# Patient Record
Sex: FEMALE | Race: WHITE
[De-identification: names, ages, dates, MRNs, and addresses within clinical notes are randomized per-mention and may not be internally consistent; named-entity substitution may affect disease eponyms.]

---

## 2017-02-06 ENCOUNTER — HOSPITAL ENCOUNTER (EMERGENCY)
Dept: HOSPITAL 62 - ER | Age: 52
Discharge: HOME | End: 2017-02-06
Payer: MEDICARE

## 2017-02-06 VITALS — SYSTOLIC BLOOD PRESSURE: 108 MMHG | DIASTOLIC BLOOD PRESSURE: 51 MMHG

## 2017-02-06 DIAGNOSIS — M32.9: ICD-10-CM

## 2017-02-06 DIAGNOSIS — Z88.8: ICD-10-CM

## 2017-02-06 DIAGNOSIS — Z85.828: ICD-10-CM

## 2017-02-06 DIAGNOSIS — Z86.14: ICD-10-CM

## 2017-02-06 DIAGNOSIS — Z88.1: ICD-10-CM

## 2017-02-06 DIAGNOSIS — G43.909: Primary | ICD-10-CM

## 2017-02-06 DIAGNOSIS — Z88.5: ICD-10-CM

## 2017-02-06 DIAGNOSIS — Z88.2: ICD-10-CM

## 2017-02-06 DIAGNOSIS — Z87.892: ICD-10-CM

## 2017-02-06 DIAGNOSIS — Z91.040: ICD-10-CM

## 2017-02-06 LAB — PROTHROMBIN TIME: 21.7 SEC (ref 11.4–15.4)

## 2017-02-06 PROCEDURE — 36415 COLL VENOUS BLD VENIPUNCTURE: CPT

## 2017-02-06 PROCEDURE — 99284 EMERGENCY DEPT VISIT MOD MDM: CPT

## 2017-02-06 PROCEDURE — 96372 THER/PROPH/DIAG INJ SC/IM: CPT

## 2017-02-06 PROCEDURE — S0119 ONDANSETRON 4 MG: HCPCS

## 2017-02-06 PROCEDURE — 85610 PROTHROMBIN TIME: CPT

## 2017-02-06 NOTE — ER DOCUMENT REPORT
ED Headache





- General


Chief Complaint: Headache


Stated Complaint: HEADACHE,NAUSEA,VOMITING


Mode of Arrival: Ambulatory


Information source: Patient


Notes: 


This is a 51-year-old female who has a history of migraines lupus and 

fibromyalgia who presents with a migraine headache.  She states that this 

headache began 4 days ago.  The headache is right-sided and throbbing.  The 

headache began just like her typical migraines began.  She has been trying 

medication at home to include Maxalt Vicodin and muscle relaxers but the pain 

has persisted and intensified today.  This was not a thunderclap sudden onset 

headache.  She has had no neck pain or rigidity and no fevers or chills.  She 

has had some nausea and vomited one time yesterday but has not thrown up today.

  She was last seen in this ER about 7 months ago for a migraine at that time 

her head CT was negative.  She tells me that this migraine is very similar in 

nature to her previous migraine headaches.


TRAVEL OUTSIDE OF THE [place] IN LAST 30 DAYS: No





- Related Data


Allergies/Adverse Reactions: 


 





diphenhydramine HCl [From Benadryl] Allergy (Severe, Verified 10/16/16 11:48)


 HEART RACING, RESTLESS LEG


metoclopramide HCl [From Reglan] Allergy (Severe, Verified 10/16/16 11:48)


 HEART RACING, RESTLESS LEG


prochlorperazine maleate [From Compazine] Allergy (Severe, Verified 10/16/16 11:

48)


 HEART RACING, RESTLESS LEG


promethazine HCl [From Phenergan] Allergy (Severe, Verified 10/16/16 11:48)


 HEART RACING RESTLESS LEG


Sulfa (Sulfonamide Antibiotics) Allergy (Severe, Verified 10/16/16 11:48)


 Anaphylaxis


doxycycline [Doxycycline] Allergy (Mild, Verified 10/16/16 11:48)


 upset stomach


latex [Latex] Allergy (Mild, Verified 10/16/16 11:48)


 Pruritis


oxycodone HCl [From Percocet] Allergy (Mild, Verified 10/16/16 11:48)


 upset stomach











Past Medical History





- General


Information source: Patient





- Social History


Smoking Status: Never Smoker


Chew tobacco use (# tins/day): No


Frequency of alcohol use: None


Drug Abuse: None


Family History: Arthritis, CAD, Hyperlipidemia, Hypertension, Malignancy.  

denies: None, Reviewed & Not Pertinent, COPD, CVA, DM, Thyroid Disfunction, 

Other


Patient has suicidal ideation: No


Patient has homicidal ideation: No





- Past Medical History


Cardiac Medical History: 


   Denies: Hx Heart Attack, Hx Hypertension


Pulmonary Medical History: Reports: Hx Bronchitis, Hx Pneumonia


   Denies: Hx Asthma, Hx COPD


Neurological Medical History: Reports: Hx Migraine.  Denies: Hx Cerebrovascular 

Accident, Hx Seizures


Endocrine Medical History: Reports: Other - fibromyalgia, SLE


Renal/ Medical History: Denies: Hx Peritoneal Dialysis


Malignancy Medical History: Reports: Hx Skin Cancer


GI Medical History: Reports: Hx Gastroesophageal Reflux Disease


Musculoskeltal Medical History: Reports Hx Arthritis, Reports Hx Fibromyalgia


Psychiatric Medical History: Reports: Hx Depression


Infectious Medical History: Reports: Hx MRSA


Past Surgical History: Reports: Hx Gynecologic Surgery - ablation, Hx Oral 

Surgery, Hx Orthopedic Surgery - right knee, Hx Tonsillectomy.  Denies: Hx 

Hysterectomy





- Immunizations


Hx Diphtheria, Pertussis, Tetanus Vaccination: No


Hx Pneumococcal Vaccination: 01/01/11





Review of Systems





- Review of Systems


Notes: 


REVIEW OF SYSTEMS:


CONSTITUTIONAL :  Denies fever,  chills, or sweats.  Denies recent illness.


EENT:   Denies eye, ear, throat, or mouth pain or symptoms.  Denies nasal or 

sinus congestion.


CARDIOVASCULAR:  Denies chest pain.


RESPIRATORY:  Denies cough, cold, or chest congestion.  Denies shortness of 

breath, difficulty breathing, or wheezing.


GASTROINTESTINAL:  Denies abdominal pain.  Nausea vomiting as per history of 

present illness


GENITOURINARY:  Denies difficulty urinating, painful urination, burning, 

frequency, or blood in urine.


MUSCULOSKELETAL:  Denies neck or back pain or joint pain or swelling.


SKIN:   Denies rash or skin lesions.


HEMATOLOGIC :   Denies easy bruising or bleeding.


LYMPHATIC:  Denies swollen, enlarged glands.


NEUROLOGICAL: As per history of present illness.  She denies paresthesias, 

weakness, imbalance, vision disturbance.


PSYCHIATRIC:  Denies anxiety or stress or depression.


ALL OTHER SYSTEMS REVIEWED AND NEGATIVE.





Physical Exam





- Vital signs


Vitals: 


 











Temp Pulse Resp BP Pulse Ox


 


 97.7 F   74   20   107/53 L  100 


 


 02/06/17 14:09  02/06/17 14:09  02/06/17 14:09  02/06/17 14:09  02/06/17 14:09














- Notes


Notes: 


PHYSICAL EXAMINATION:





GENERAL: Well-appearing, well-nourished and in no acute distress.  She does 

have sunglasses on and appears mildly uncomfortable secondary to headache.  She 

is pleasant and conversant.





HEAD: Atraumatic, normocephalic.





EYES: Pupils equal round and reactive to light, extraocular movements intact, 

sclera anicteric, conjunctiva are normal.





ENT: nares patent, oropharynx clear without exudates.  Moist mucous membranes.





NECK: Normal range of motion, supple without lymphadenopathy





LUNGS: Breath sounds clear to auscultation bilaterally and equal.  No wheezes 

rales or rhonchi.





HEART: Regular rate and rhythm without murmurs





ABDOMEN: Soft, nontender, normoactive bowel sounds.  No guarding, no rebound.  

No masses appreciated.





EXTREMITIES: Normal range of motion, no pitting or edema.  No cyanosis.





NEUROLOGICAL: Cranial nerves grossly intact.  Normal speech.   Normal sensory, 

motor, and reflex exams.





PSYCH: Normal mood, normal affect.





SKIN: Warm, Dry, normal turgor, no rashes or lesions noted.





Course





- Re-evaluation


Re-evalutation: 


02/06/17 17:55


This patient has a presentation that is very typical of her previous migraines.

  She did have a CT within the year which was negative.  At this time I do not 

feel her symptoms are consistent with CNS infection or subarachnoid hemorrhage 

or CNS mass lesion.  At this time we will defer repeat head CT and treat with 

IV fluids and IV migraine medication.  Unfortunately she is allergic to many 

medications such as Benadryl, Reglan, Compazine.





02/06/17 19:59


Patient states she has had relief with the IM medications.  Her pain is down to 

a 3.  She is still uncomfortable with pain but is to a point where she could 

rest in her own bed in a dark room.  She is agreeable with discharge home her 

neurologic exam remains normal.  She will follow up with her primary care 

physician and in the interim she will return to the emergency department should 

she develop any worsening symptoms such as fevers, neck pain, uncontrolled 

vomiting, weakness or numbness, or any worsening symptoms or concerns.  This 

plan was discussed with her friend who brought her here to the emergency 

department as well and she assures me that the patient will have someone with 

her and will be able to return for any worsening symptoms if needed.





- Vital Signs


Vital signs: 


 











Temp Pulse Resp BP Pulse Ox


 


 97.7 F   74   20   107/53 L  100 


 


 02/06/17 14:09  02/06/17 14:09  02/06/17 14:09  02/06/17 14:09  02/06/17 14:09














- Laboratory


Laboratory results interpreted by me: 


 











  02/06/17





  14:20


 


PT  21.7 H














Discharge





- Discharge


Clinical Impression: 


Migraine headache


Qualifiers:


 Migraine type: unspecified Status migrainosus presence: without status 

migrainosus Intractability: not intractable Qualified Code(s): G43.909 - 

Migraine, unspecified, not intractable, without status migrainosus





Condition: Stable


Disposition: HOME, SELF-CARE


Additional Instructions: 


Migraine Headache





     The physician feels that your symptoms are due to a migraine attack.  

Migraines are caused by changes in the blood vessels of the head.  Arteries go 

into spasm, often causing warning symptoms that a headache may begin soon.  As 

the spasm goes away, the vessels dilate and throb, causing the pounding pain of 

a migraine headache. Migraines often cause nausea and vomiting.


     The treatment of headaches varies with severity and cause of pain. Not all 

headaches need pain shots -- in fact, there is evidence that using narcotics 

for headaches may make them worse in the long run.  The physician will 

determine the therapy that's in your best interest for this particular headache.


     Medications are available that may prevent migraines, or stop them as they 

first occur.  If one medication is not helpful, try another.  If migraines are 

frequent, be patient -- follow the doctor's recommendations.


     Call the physician if you are worsening, or if new symptoms arise.





HEADACHE:





     The physician does not feel that the headache you are experiencing has a 

serious underlying cause.  Most headaches are due to emotional stress, with 

resultant muscle tension (tension headache). Occasionally, headaches are 

secondary to changes in the blood vessels of the scalp (vascular headache and 

migraine headache).  Sometimes, a headache is the first symptom of another 

developing illness, such as a viral infection.  You have no evidence of stroke, 

bleeding, meningitis, or other serious cause of your headache.


     The treatment of headaches varies with the severity and cause of the pain.

  Not all headaches need pain shots.  In fact, there is evidence that using 

narcotics for headaches may make them worse in the long run.  The physician 

will determine the therapy that's in your best interest.


     If you develop a fever, if the headache is different from any you've 

previously experienced, or if the headache progressively worsens, then call 

your physician at once or go to the emergency room.











[place] MEDICATION:


     You have been given a medication to suppress nausea and vomiting. This 

type of medication can be given as a shot, pill, or suppository. It will 

usually last for many hours.  Pills and shots usually last six to eight hours, 

suppositories last about 12 hours.  For the typical illness, only one or two 

doses of the medication may be necessary.


     Mild lightheadedness may occur.  This type of medicine can cause 

drowsiness.  Do not drive or operate dangerous machinery while under its 

influence.  Do not mix with alcohol.


     See your doctor at once if you have muscle spasms or tightness, or 

uncontrollable motions (particularly of the neck, mouth, or jaw). Persistent 

vomiting or severe lightheadedness should also be evaluated by the physician.














TORADOL INJECTION:


     You have been given an injection of ketorolac tromethamine (Toradol).  

This is an excellent, safe drug for pain control.  It also has potent 

antiinflammatory action.  You should have significant pain relief within about 

one hour.


     Toradol is not addicting and is non-sedating.  It does not interfere with 

driving or work.


     Call or return if you develop itching, hives, shortness of breath, or rash.








PAIN MEDICATION INJECTION:


     You have received an injection of a pain medication.  You should 

experience significant pain relief within 45 minutes.  This drug is a narcotic -

- it will impair your judgement, slow your reaction time and make you sleepy (

as well as relieve your pain).  Narcotics also can cause nausea.


     You should not drive, work with machinery, or perform any task requiring 

mental alertness until all effects of the medication are gone -- six to eight 

hours.  Do not take any alcohol, or sedatives, and do not take any other 

medication without checking with your physician.














FOLLOW-UP CARE:


If you have been referred to a physician for follow-up care, call the physician

s office for an appointment as you were instructed or within the next two days.

  If you experience worsening or a significant change in your symptoms, notify 

the physician immediately or return to the Emergency Department at any time for 

re-evaluation.

## 2017-02-06 NOTE — ER DOCUMENT REPORT
ED Medical Screen (RME)





- General


Stated Complaint: HEADACHE,NAUSEA,VOMITING


Mode of Arrival: Ambulatory


Information source: Patient


Notes: 


Patient complains of migraine headache.  Patient reports right-sided headache 

pain that started 4 days ago.  Patient does report nausea and vomiting.  No 

head injury.  No fever.  Patient reports, Vicodin and muscle relaxer without 

relief her symptoms.





hx: Lupus, fibromyalgia, migraines





I have greeted and performed a rapid initial assessment of this patient.  A 

comprehensive ED assessment and evaluation of the patient, analysis of test 

results and completion of the medical decision making process will be conducted 

by additional ED providers.


TRAVEL OUTSIDE OF THE [place] IN LAST 30 DAYS: No





- Related Data


Allergies/Adverse Reactions: 


 





diphenhydramine HCl [From Benadryl] Allergy (Severe, Verified 10/16/16 11:48)


 HEART RACING, RESTLESS LEG


metoclopramide HCl [From Reglan] Allergy (Severe, Verified 10/16/16 11:48)


 HEART RACING, RESTLESS LEG


prochlorperazine maleate [From Compazine] Allergy (Severe, Verified 10/16/16 11:

48)


 HEART RACING, RESTLESS LEG


promethazine HCl [From Phenergan] Allergy (Severe, Verified 10/16/16 11:48)


 HEART RACING RESTLESS LEG


Sulfa (Sulfonamide Antibiotics) Allergy (Severe, Verified 10/16/16 11:48)


 Anaphylaxis


doxycycline [Doxycycline] Allergy (Mild, Verified 10/16/16 11:48)


 upset stomach


latex [Latex] Allergy (Mild, Verified 10/16/16 11:48)


 Pruritis


oxycodone HCl [From Percocet] Allergy (Mild, Verified 10/16/16 11:48)


 upset stomach











Past Medical History





- Past Medical History


Cardiac Medical History: 


   Denies: Hx Heart Attack, Hx Hypertension


Pulmonary Medical History: Reports: Hx Bronchitis, Hx Pneumonia


   Denies: Hx Asthma, Hx COPD


Neurological Medical History: Reports: Hx Migraine.  Denies: Hx Cerebrovascular 

Accident, Hx Seizures


Malignancy Medical History: Reports: Hx Skin Cancer


GI Medical History: Reports: Hx Gastroesophageal Reflux Disease


Musculoskeltal Medical History: Reports Hx Arthritis, Reports Hx Fibromyalgia


Psychiatric Medical History: Reports: Hx Depression


Infectious Medical History: Reports: Hx MRSA


Past Surgical History: Reports: Hx Gynecologic Surgery - ablation, Hx Oral 

Surgery, Hx Orthopedic Surgery - right knee, Hx Tonsillectomy.  Denies: Hx 

Hysterectomy





- Immunizations


Hx Diphtheria, Pertussis, Tetanus Vaccination: No





Physical Exam





- Neurological


Cognition: Normal


[place] Coma Scale Eye Opening: Spontaneous


[place] Coma Scale Verbal: Oriented


[place] Coma Scale Motor: Obeys Commands


[place] Coma Scale Total: 15

## 2017-03-15 ENCOUNTER — HOSPITAL ENCOUNTER (OUTPATIENT)
Dept: HOSPITAL 62 - SP | Age: 52
End: 2017-03-15
Attending: INTERNAL MEDICINE
Payer: OTHER GOVERNMENT

## 2017-03-15 DIAGNOSIS — M79.662: Primary | ICD-10-CM

## 2017-03-15 PROCEDURE — 93971 EXTREMITY STUDY: CPT

## 2017-04-06 ENCOUNTER — HOSPITAL ENCOUNTER (OUTPATIENT)
Dept: HOSPITAL 62 - SP | Age: 52
End: 2017-04-06
Attending: INTERNAL MEDICINE
Payer: MEDICARE

## 2017-04-06 DIAGNOSIS — M79.605: Primary | ICD-10-CM

## 2017-04-06 PROCEDURE — 93971 EXTREMITY STUDY: CPT

## 2017-10-10 ENCOUNTER — HOSPITAL ENCOUNTER (OUTPATIENT)
Dept: HOSPITAL 62 - WI | Age: 52
End: 2017-10-10
Attending: PHYSICIAN ASSISTANT
Payer: MEDICARE

## 2017-10-10 DIAGNOSIS — Z12.31: Primary | ICD-10-CM

## 2017-10-10 PROCEDURE — 77067 SCR MAMMO BI INCL CAD: CPT

## 2017-10-10 PROCEDURE — 77063 BREAST TOMOSYNTHESIS BI: CPT

## 2017-10-10 PROCEDURE — G0202 SCR MAMMO BI INCL CAD: HCPCS

## 2017-10-10 NOTE — WOMENS IMAGING REPORT
EXAM DESCRIPTION:  3D SCREENING MAMMO BILAT



COMPLETED DATE/TIME:  10/10/2017 8:29 am



REASON FOR STUDY:  ROUTINE SCREENING; Z12.31 Z12.31  ENCNTR SCREEN MAMMOGRAM FOR MALIGNANT NEOPLASM O
F JEREMIAS



COMPARISON:  Multiple since 2011



TECHNIQUE:  Standard craniocaudal and mediolateral oblique views of each breast recorded using digita
l acquisition and breast tomosynthesis.



LIMITATIONS:  None.



FINDINGS:  Findings present which are benign by mammographic criteria.  No suspicious masses, calcifi
cations or architectural distortion.

Pertinent benign findings: Stable bilateral breast and skin calcifications

Read with the assistance of CAD.

.[facility] - R2 Cenova Version 1.3

.[facility] - R2 Cenova Version 1.3

.[facility] - R2 Cenova Version 2.4

.[facility] - R2 Cenova Version 2.4

.[facility] - R2  Version 9.2

Benign mammographic findings may include one or more of the following:  Smooth masses, popcorn/rim/co
arse calcifications, asymmetries, post-procedure changes, and lesions with long-standing stability.



IMPRESSION:  BENIGN MAMMOGRAPHIC FINDINGS.  BIRADS 2



BREAST DENSITY:  d. The breasts are extremely dense, which lowers the sensitivity of mammography.



BIRAD:  2 BENIGN FINDING(S)



RECOMMENDATION:  RECOMMENDATION: ROUTINE SCREENING

Please continue yearly bilateral screening tomosynthesis in October 2018



COMMENT:  The patient has been notified of the results by letter per SA requirements. Additional no
tification policies are in place for contacting patient with suspicious or incomplete findings.

Quality ID #[id number]: The American College of Radiology recommends an annual screening mammogram for women
 aged 40 years or over. This facility utilizes a reminder system to ensure that all patients receive 
reminder letters, and/or direct phone calls for appointments. This includes reminders for routine scr
eening mammograms, diagnostic mammograms, or other Breast Imaging Interventions when appropriate.  Th
is patient will be placed in the appropriate reminder system.

The American College of Radiology (ACR) has developed recommendations for screening MRI of the breast
s in certain patient populations, to be used in conjunction with mammography.  Breast MRI surveillanc
e may be appropriate for women with more than 20% lifetime risk of developing breast cancer  as deter
mined by genetic testing, significant family history of the disease, or history of mantle radiation f
or Hodgkins Disease.  ACR Practice Guidelines 2008.

[employer]



PQRS 6045F: Fluoroscopic imaging is not utilized for breast tomosynthesis.



TECHNICAL DOCUMENTATION:  FINDING NUMBER: (1)

ASSESSMENT: (1)

JOB ID:  [id number]

 2011 [employer]- All Rights Reserved

## 2017-11-17 ENCOUNTER — HOSPITAL ENCOUNTER (OUTPATIENT)
Dept: HOSPITAL 62 - END | Age: 52
Discharge: HOME | End: 2017-11-17
Attending: INTERNAL MEDICINE
Payer: MEDICARE

## 2017-11-17 VITALS — SYSTOLIC BLOOD PRESSURE: 114 MMHG | DIASTOLIC BLOOD PRESSURE: 66 MMHG

## 2017-11-17 DIAGNOSIS — D68.61: ICD-10-CM

## 2017-11-17 DIAGNOSIS — Z85.828: ICD-10-CM

## 2017-11-17 DIAGNOSIS — M32.9: ICD-10-CM

## 2017-11-17 DIAGNOSIS — R19.4: ICD-10-CM

## 2017-11-17 DIAGNOSIS — K64.8: Primary | ICD-10-CM

## 2017-11-17 DIAGNOSIS — Z91.040: ICD-10-CM

## 2017-11-17 DIAGNOSIS — Z88.2: ICD-10-CM

## 2017-11-17 DIAGNOSIS — M79.7: ICD-10-CM

## 2017-11-17 DIAGNOSIS — Z79.01: ICD-10-CM

## 2017-11-17 LAB
APTT BLD: 60.1 SEC (ref 23.5–35.8)
PROTHROMBIN TIME: 23 SEC (ref 11.4–15.4)

## 2017-11-17 PROCEDURE — 0DJD8ZZ INSPECTION OF LOWER INTESTINAL TRACT, VIA NATURAL OR ARTIFICIAL OPENING ENDOSCOPIC: ICD-10-PCS | Performed by: INTERNAL MEDICINE

## 2017-11-17 PROCEDURE — 36415 COLL VENOUS BLD VENIPUNCTURE: CPT

## 2017-11-17 PROCEDURE — 45378 DIAGNOSTIC COLONOSCOPY: CPT

## 2017-11-17 PROCEDURE — 85730 THROMBOPLASTIN TIME PARTIAL: CPT

## 2017-11-17 PROCEDURE — 84703 CHORIONIC GONADOTROPIN ASSAY: CPT

## 2017-11-17 PROCEDURE — 85610 PROTHROMBIN TIME: CPT

## 2017-11-17 NOTE — OPERATIVE REPORT
Operative Report


DATE OF SURGERY: 11/17/17


Operative Report: 





The risks, benefits and alternatives of the procedure including risks of 

bleeding, perforation requiring surgery are explained to the patient in detail 

and informed consent was obtained.  Patient was taken back to the operating 

room and placed in the left, lateral decubital position.  Timeout was called.  

Propofol medications administered.  A rectal examination is done which did not 

reveal any masses, tears or fissures.  An Olympus videoscope was inserted into 

the patient's rectum.  The scope was then carefully advanced all the way to the 

cecum.  Cecum was identified by the usual anatomical landmarks including the 

ileocecal valve as well as the appendiceal office.  Photodocumentation was 

obtained.  Patient has a very redundant colon with unusual anatomy around the 

area of the splenic flexure.  Prep is good.  The scope was then sequentially 

pulled back out via the various segments of the colon including the ascending 

colon, hepatic flexure, transverse colon, splenic flexure, descending colon 

finding to the rectosigmoid portions of the colon.  Retroflexion maneuvers 

performed.


PREOPERATIVE DIAGNOSIS: Change in bowel habits


POSTOPERATIVE DIAGNOSIS: Normal screening colonoscopy, internal hemorrhoids


OPERATION: Diagnostic colonoscopy


SURGEON: HAFSA KEATING


ANESTHESIA: LMAC


TISSUE REMOVED OR ALTERED: None.


COMPLICATIONS: 





None.


ESTIMATED BLOOD LOSS: None.


INTRAOPERATIVE FINDINGS: As noted above.


PROCEDURE: 





Patient tolerated procedure well.


No immediate postprocedure complications are noted.


Patient discharged in good condition.


Discharge date 11/17/2017.


Discharge diet: Regular.


Discharge activity: Regular.


2-3 week follow-up to discuss findings.


Patient is instructed to call the office or proceed to the emergency room 

should there be any further problems or questions.


Surveillance colonoscopy in 5 years

## 2018-06-19 ENCOUNTER — HOSPITAL ENCOUNTER (EMERGENCY)
Dept: HOSPITAL 62 - ER | Age: 53
Discharge: HOME | End: 2018-06-19
Payer: MEDICARE

## 2018-06-19 VITALS — DIASTOLIC BLOOD PRESSURE: 84 MMHG | SYSTOLIC BLOOD PRESSURE: 106 MMHG

## 2018-06-19 DIAGNOSIS — Z88.2: ICD-10-CM

## 2018-06-19 DIAGNOSIS — Z86.14: ICD-10-CM

## 2018-06-19 DIAGNOSIS — Z91.040: ICD-10-CM

## 2018-06-19 DIAGNOSIS — R11.10: ICD-10-CM

## 2018-06-19 DIAGNOSIS — R51: Primary | ICD-10-CM

## 2018-06-19 PROCEDURE — 96375 TX/PRO/DX INJ NEW DRUG ADDON: CPT

## 2018-06-19 PROCEDURE — 96361 HYDRATE IV INFUSION ADD-ON: CPT

## 2018-06-19 PROCEDURE — 96374 THER/PROPH/DIAG INJ IV PUSH: CPT

## 2018-06-19 PROCEDURE — 99284 EMERGENCY DEPT VISIT MOD MDM: CPT

## 2018-06-19 NOTE — ER DOCUMENT REPORT
ED General





- General


Chief Complaint: Headache


Stated Complaint: HEADACHE,VOMITING,FACIAL PAIN


Time Seen by Provider: 06/19/18 19:48


TRAVEL OUTSIDE OF THE [place] IN LAST 30 DAYS: No





- HPI


Notes: 





Patient is a 52-year-old female with a history of lupus, fiber myalgia, and 

chronic migraines who presents to the ED complaining of another migraine 

exacerbation 2 days.  Patient states that her home medicines have not been 

working for her very well which brought her to the emergency department.  

Patient states that she has associated light sensitivity, left-sided headache, 

nausea and vomiting.  Patient states that her symptoms are very typical of 

previous migraines and has no new developments with this headache.  Pain does 

not radiate.  Patient has not been able to eat or drink due to the nausea and 

vomiting.  She is still urinating and having normal bowel movements otherwise.  

No other recent illness or injury.  Denies any fever, head injury, neck pain, 

changes in vision/speech/mentation/hearing, URI, sore throat, chest pain, 

palpitations, syncope, cough, shortness of breath, wheeze, dyspnea, abdominal 

pain, nausea/vomiting/diarrhea, urinary retention, dysuria, hematuria, loss of 

control of bowel or bladder, numbness/tingling, saddle anesthesia, muscle 

paralysis/weakness, or rash.





- Related Data


Allergies/Adverse Reactions: 


 





diphenhydramine HCl [From Benadryl] Allergy (Severe, Verified 11/17/17 08:57)


 HEART RACING, RESTLESS LEG


metoclopramide HCl [From Reglan] Allergy (Severe, Verified 11/17/17 08:57)


 HEART RACING, RESTLESS LEG


prochlorperazine maleate [From Compazine] Allergy (Severe, Verified 11/17/17 08:

57)


 HEART RACING, RESTLESS LEG


promethazine HCl [From Phenergan] Allergy (Severe, Verified 11/17/17 08:57)


 HEART RACING RESTLESS LEG


Sulfa (Sulfonamide Antibiotics) Allergy (Severe, Verified 11/17/17 08:57)


 Anaphylaxis


doxycycline [Doxycycline] Allergy (Mild, Verified 11/17/17 08:57)


 upset stomach


latex [Latex] Allergy (Mild, Verified 11/17/17 08:57)


 Pruritis


oxycodone HCl [From Percocet] Allergy (Mild, Verified 11/17/17 08:57)


 upset stomach


cefdinir [From Omnicef] Allergy (Verified 11/17/17 08:57)


 











Past Medical History





- Social History


Smoking Status: Unknown if Ever Smoked


Family History: Arthritis, CAD, Hyperlipidemia, Hypertension, Malignancy.  

denies: None, Reviewed & Not Pertinent, COPD, CVA, DM, Thyroid Disfunction, 

Other


Patient has suicidal ideation: No


Patient has homicidal ideation: No





- Past Medical History


Cardiac Medical History: 


   Denies: Hx Coronary Artery Disease, Hx Heart Attack, Hx Hypertension


Pulmonary Medical History: Reports: Hx Bronchitis, Hx Pneumonia


   Denies: Hx Asthma, Hx COPD


Neurological Medical History: Reports: Hx Migraine.  Denies: Hx Cerebrovascular 

Accident, Hx Seizures


Renal/ Medical History: Denies: Hx Peritoneal Dialysis


Malignancy Medical History: Reports: Hx Skin Cancer


GI Medical History: Reports: Hx Gastroesophageal Reflux Disease


Musculoskeltal Medical History: Denies Hx Arthritis, Reports Hx Fibromyalgia


Psychiatric Medical History: Reports: Hx Depression


Infectious Medical History: Reports: Hx MRSA


Past Surgical History: Reports: Hx Gynecologic Surgery - ablation, Hx Oral 

Surgery, Hx Orthopedic Surgery - right knee, Hx Tonsillectomy.  Denies: Hx 

Hysterectomy





- Immunizations


Hx Diphtheria, Pertussis, Tetanus Vaccination: Yes


Hx Pneumococcal Vaccination: 10/01/17





Review of Systems





- Review of Systems


-: Yes All other systems reviewed and negative





Physical Exam





- Vital signs


Vitals: 


 











Temp Pulse Resp BP Pulse Ox


 


 98.0 F   71   20   111/50 L  100 


 


 06/19/18 16:13  06/19/18 16:13  06/19/18 16:13  06/19/18 16:13  06/19/18 16:13














- Notes


Notes: 





PHYSICAL EXAMINATION:  





GENERAL: Well-appearing, well-nourished and in no acute distress.  A&Ox4.  

Answers questions appropriately.





HEAD: Atraumatic, normocephalic.  Non-tender.  No erazo sign





EYES: Pupils equal round and reactive to light, extraocular movements intact, 

sclera anicteric, conjunctiva are normal.  No raccoon eyes/entrapment.  No 

nystagmus.





ENT: EAC clear b/l.  TM's intact b/l without erythema, fluid, or perforation.  

Nares patent and without discharge.  oropharynx clear without exudates.  No 

tonsilar hypertrophy or erythema.  Moist mucous membranes.  No sinus 

tenderness.  





NECK: Normal range of motion, supple without lymphadenopathy.  No rigidity.  No 

midline tenderness. 





LUNGS: Breath sounds clear to auscultation bilaterally and equal.  No wheezes 

rales or rhonchi.





HEART: Regular rate and rhythm without murmurs, rubs, gallops.





ABDOMEN: Soft, nontender, nondistended abdomen.  No guarding, no rebound.  No 

masses appreciated.  Normal bowel sounds present.  No CVA tenderness 

bilaterally.  





Extremities:  No cyanosis, clubbing, or edema b/l.  Peripheral pulses 2+.  

Capillary refill less than 2 seconds.





NEUROLOGICAL: NIH 0.  GCS 15. Cranial nerves grossly intact.  Normal speech.  

Normal sensory, motor exams.  Reflexes 2+ b/l. 





PSYCH: Normal mood, normal affect.





SKIN: Warm, Dry, normal turgor, no rashes or lesions noted.





Course





- Re-evaluation


Re-evalutation: 





06/19/18 21:43


Patient is an afebrile, well-hydrated, 52-year-old female who presents to the 

ED with a headache, suspect 1 of her typical migraines.  Vitals are acceptable.

  PE is otherwise unremarkable for any focal neurological deficits.  Patient 

was given p.o. and IM medications which resolved her headache.  She has no 

significant tachycardia, tachypnea, or hypoxia.  She is now tolerating p.o. 

without difficulties and is nontoxic-appearing.  Patient states that she is 

feeling much better and would like to go home.  No other labs or imaging 

warranted at this time based on H&P.  Low suspicion for any acute glaucoma, 

temporal arteritis, meningitis, intracranial hemorrhage, ischemic stroke, or 

fracture at this time.  Patient is aware that her condition can change from 

initial presentation and that she needs to monitor symptoms closely for any 

acute changes.  Conservative measures for symptoms.  Continue home medications.

  Recheck with your PCM in 3-5 days.  Return to the ED with any worsening/

concerning symptoms otherwise as reviewed in discharge.  Patient is in 

agreement.





- Vital Signs


Vital signs: 


 











Temp Pulse Resp BP Pulse Ox


 


 97.7 F   63   18   95/49 L  99 


 


 06/19/18 19:44  06/19/18 19:44  06/19/18 21:01  06/19/18 21:01  06/19/18 21:01














Discharge





- Discharge


Clinical Impression: 


Headache


Qualifiers:


 Headache type: unspecified Headache chronicity pattern: acute headache 

Intractability: not intractable Qualified Code(s): R51 - Headache





Condition: Stable


Disposition: HOME, SELF-CARE


Instructions:  Headache ([facility])


Additional Instructions: 


Rest, Ice/cool compress


Tylenol/ibuprofen as needed


Light stretches daily


Strength exercises as able


Moist heat and massage may help


F/u with your PCP in 3-5 days for a recheck


Consider consult(s) with Neurology for ongoing/worsening symptoms





Return to the ED with any worsening symptoms and/or development of fever, 

headache, changes in behavior/mentation/vision/speech, chest pain, palpitations

, syncope, shortness of breath, trouble breathing, abdominal pain, n/v/d, blood 

in stool/urine, loss of control of bowel/bladder, urinary retention, muscle 

weakness/paralysis, saddle anesthesia, numbness/tingling, or other worsening 

symptoms that are concerning to you.


Referrals: 


LUCIA MCDOWELL PA-C [Primary Care Provider] - Follow up in 3-5 days

## 2018-11-06 ENCOUNTER — HOSPITAL ENCOUNTER (INPATIENT)
Dept: HOSPITAL 62 - ER | Age: 53
LOS: 4 days | Discharge: HOME | DRG: 920 | End: 2018-11-10
Attending: INTERNAL MEDICINE | Admitting: INTERNAL MEDICINE
Payer: MEDICARE

## 2018-11-06 DIAGNOSIS — J32.0: ICD-10-CM

## 2018-11-06 DIAGNOSIS — M79.7: ICD-10-CM

## 2018-11-06 DIAGNOSIS — Z98.890: ICD-10-CM

## 2018-11-06 DIAGNOSIS — Z23: ICD-10-CM

## 2018-11-06 DIAGNOSIS — K58.0: ICD-10-CM
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
## 2018-11-06 DIAGNOSIS — Z79.01: ICD-10-CM

## 2018-11-06 DIAGNOSIS — Z86.14: ICD-10-CM

## 2018-11-06 DIAGNOSIS — R04.0: ICD-10-CM

## 2018-11-06 DIAGNOSIS — T45.515A: ICD-10-CM

## 2018-11-06 DIAGNOSIS — J95.830: Primary | ICD-10-CM

## 2018-11-06 DIAGNOSIS — C44.90: ICD-10-CM

## 2018-11-06 DIAGNOSIS — G89.29: ICD-10-CM

## 2018-11-06 DIAGNOSIS — K21.9: ICD-10-CM

## 2018-11-06 DIAGNOSIS — D68.62: ICD-10-CM

## 2018-11-06 DIAGNOSIS — Z79.891: ICD-10-CM
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
## 2018-11-06 PROCEDURE — 86850 RBC ANTIBODY SCREEN: CPT

## 2018-11-06 PROCEDURE — 85025 COMPLETE CBC W/AUTO DIFF WBC: CPT

## 2018-11-06 PROCEDURE — 90686 IIV4 VACC NO PRSV 0.5 ML IM: CPT

## 2018-11-06 PROCEDURE — 99284 EMERGENCY DEPT VISIT MOD MDM: CPT

## 2018-11-06 PROCEDURE — 86901 BLOOD TYPING SEROLOGIC RH(D): CPT

## 2018-11-06 PROCEDURE — 96374 THER/PROPH/DIAG INJ IV PUSH: CPT

## 2018-11-06 PROCEDURE — 36415 COLL VENOUS BLD VENIPUNCTURE: CPT

## 2018-11-06 PROCEDURE — 86900 BLOOD TYPING SEROLOGIC ABO: CPT

## 2018-11-06 PROCEDURE — 85610 PROTHROMBIN TIME: CPT

## 2018-11-06 PROCEDURE — 80048 BASIC METABOLIC PNL TOTAL CA: CPT

## 2018-11-07 LAB
ADD MANUAL DIFF: NO
ADD MANUAL DIFF: NO
ANION GAP SERPL CALC-SCNC: 10 MMOL/L (ref 5–19)
BASOPHILS # BLD AUTO: 0.1 10^3/UL (ref 0–0.2)
BASOPHILS # BLD AUTO: 0.1 10^3/UL (ref 0–0.2)
BASOPHILS NFR BLD AUTO: 0.7 % (ref 0–2)
BASOPHILS NFR BLD AUTO: 1.2 % (ref 0–2)
BUN SERPL-MCNC: 17 MG/DL (ref 7–20)
CALCIUM: 9.6 MG/DL (ref 8.4–10.2)
CHLORIDE SERPL-SCNC: 108 MMOL/L (ref 98–107)
CO2 SERPL-SCNC: 26 MMOL/L (ref 22–30)
EOSINOPHIL # BLD AUTO: 0.2 10^3/UL (ref 0–0.6)
EOSINOPHIL # BLD AUTO: 0.2 10^3/UL (ref 0–0.6)
EOSINOPHIL NFR BLD AUTO: 2.1 % (ref 0–6)
EOSINOPHIL NFR BLD AUTO: 3.7 % (ref 0–6)
ERYTHROCYTE [DISTWIDTH] IN BLOOD BY AUTOMATED COUNT: 13.1 % (ref 11.5–14)
ERYTHROCYTE [DISTWIDTH] IN BLOOD BY AUTOMATED COUNT: 13.2 % (ref 11.5–14)
GLUCOSE SERPL-MCNC: 94 MG/DL (ref 75–110)
HCT VFR BLD CALC: 32.1 % (ref 36–47)
HCT VFR BLD CALC: 33.4 % (ref 36–47)
HGB BLD-MCNC: 11.1 G/DL (ref 12–15.5)
HGB BLD-MCNC: 11.4 G/DL (ref 12–15.5)
INR PPP: 1.96
LYMPHOCYTES # BLD AUTO: 2.1 10^3/UL (ref 0.5–4.7)
LYMPHOCYTES # BLD AUTO: 2.8 10^3/UL (ref 0.5–4.7)
LYMPHOCYTES NFR BLD AUTO: 31.5 % (ref 13–45)
LYMPHOCYTES NFR BLD AUTO: 36.2 % (ref 13–45)
MCH RBC QN AUTO: 32.2 PG (ref 27–33.4)
MCH RBC QN AUTO: 32.2 PG (ref 27–33.4)
MCHC RBC AUTO-ENTMCNC: 34.3 G/DL (ref 32–36)
MCHC RBC AUTO-ENTMCNC: 34.6 G/DL (ref 32–36)
MCV RBC AUTO: 93 FL (ref 80–97)
MCV RBC AUTO: 94 FL (ref 80–97)
MONOCYTES # BLD AUTO: 0.6 10^3/UL (ref 0.1–1.4)
MONOCYTES # BLD AUTO: 0.7 10^3/UL (ref 0.1–1.4)
MONOCYTES NFR BLD AUTO: 8.3 % (ref 3–13)
MONOCYTES NFR BLD AUTO: 9.5 % (ref 3–13)
NEUTROPHILS # BLD AUTO: 2.9 10^3/UL (ref 1.7–8.2)
NEUTROPHILS # BLD AUTO: 5.2 10^3/UL (ref 1.7–8.2)
NEUTS SEG NFR BLD AUTO: 49.4 % (ref 42–78)
NEUTS SEG NFR BLD AUTO: 57.4 % (ref 42–78)
PLATELET # BLD: 134 10^3/UL (ref 150–450)
PLATELET # BLD: 137 10^3/UL (ref 150–450)
POTASSIUM SERPL-SCNC: 4.3 MMOL/L (ref 3.6–5)
PROTHROMBIN TIME: 23.3 SEC (ref 11.4–15.4)
RBC # BLD AUTO: 3.45 10^6/UL (ref 3.72–5.28)
RBC # BLD AUTO: 3.55 10^6/UL (ref 3.72–5.28)
SODIUM SERPL-SCNC: 144 MMOL/L (ref 137–145)
TOTAL CELLS COUNTED % (AUTO): 100 %
TOTAL CELLS COUNTED % (AUTO): 100 %
WBC # BLD AUTO: 5.8 10^3/UL (ref 4–10.5)
WBC # BLD AUTO: 9 10^3/UL (ref 4–10.5)

## 2018-11-07 PROCEDURE — 2Y41X5Z PACKING OF NASAL REGION USING PACKING MATERIAL: ICD-10-PCS | Performed by: EMERGENCY MEDICINE

## 2018-11-07 PROCEDURE — 099M7ZZ DRAINAGE OF NASAL SEPTUM, VIA NATURAL OR ARTIFICIAL OPENING: ICD-10-PCS | Performed by: OTOLARYNGOLOGY

## 2018-11-07 RX ADMIN — AMOXICILLIN AND CLAVULANATE POTASSIUM SCH TAB: 500; 125 TABLET, FILM COATED ORAL at 13:22

## 2018-11-07 RX ADMIN — ACETAMINOPHEN PRN MG: 325 TABLET ORAL at 09:52

## 2018-11-07 RX ADMIN — DOCUSATE SODIUM SCH: 100 CAPSULE, LIQUID FILLED ORAL at 17:02

## 2018-11-07 RX ADMIN — FENTANYL CITRATE PRN MCG: 50 INJECTION INTRAMUSCULAR; INTRAVENOUS at 21:32

## 2018-11-07 RX ADMIN — ACETAMINOPHEN PRN MG: 325 TABLET ORAL at 20:15

## 2018-11-07 RX ADMIN — AMOXICILLIN AND CLAVULANATE POTASSIUM SCH TAB: 500; 125 TABLET, FILM COATED ORAL at 21:27

## 2018-11-07 RX ADMIN — AMOXICILLIN AND CLAVULANATE POTASSIUM SCH TAB: 500; 125 TABLET, FILM COATED ORAL at 10:19

## 2018-11-07 RX ADMIN — DOCUSATE SODIUM SCH: 100 CAPSULE, LIQUID FILLED ORAL at 09:23

## 2018-11-07 NOTE — PROGRESS NOTE
Provider Note


Provider Note: 


This is 53 years old  female patient admitted for epistaxis.  Patient 

had previous sinus surgery for maxillary sinusitis which complicated by 

recurrent epistaxis.  Patient has been on Coumadin for for antiphospholipid 

antibody syndrome.  Currently her Coumadin is on hold.  I seen patient and 

accepted her.

## 2018-11-07 NOTE — ER DOCUMENT REPORT
ED General





- General


Chief Complaint: Nose Bleed


Stated Complaint: BLOODY NOSE


Time Seen by Provider: 11/07/18 00:19


Notes: 





53-year-old female with a history of complicated sinuses status post multiple 

sinus surgeries and with a known septal defect, also on Coumadin for 

antiphospholipid antibody syndrome, presents with nosebleed for 12 hours onset 

slow and then worsening throughout the day both nostrils at the same time.  She 

had packing in until 3 days ago which she removed at home with no events, but 

has been taking antibiotics which she is afraid are interacting with her 

Coumadin.  She sees an ENT doctor in [facility].


TRAVEL OUTSIDE OF THE [place] IN LAST 30 DAYS: No





- Related Data


Allergies/Adverse Reactions: 


 





diphenhydramine HCl [From Benadryl] Allergy (Severe, Verified 11/17/17 08:57)


 HEART RACING, RESTLESS LEG


metoclopramide HCl [From Reglan] Allergy (Severe, Verified 11/17/17 08:57)


 HEART RACING, RESTLESS LEG


prochlorperazine maleate [From Compazine] Allergy (Severe, Verified 11/17/17 08:

57)


 HEART RACING, RESTLESS LEG


promethazine HCl [From Phenergan] Allergy (Severe, Verified 11/17/17 08:57)


 HEART RACING RESTLESS LEG


Sulfa (Sulfonamide Antibiotics) Allergy (Severe, Verified 11/17/17 08:57)


 Anaphylaxis


doxycycline [Doxycycline] Allergy (Mild, Verified 11/17/17 08:57)


 upset stomach


latex [Latex] Allergy (Mild, Verified 11/17/17 08:57)


 Pruritis


oxycodone HCl [From Percocet] Allergy (Mild, Verified 11/17/17 08:57)


 upset stomach


cefdinir [From Omnicef] Allergy (Verified 11/17/17 08:57)


 











Past Medical History





- Social History


Smoking Status: Never Smoker


Family History: Arthritis, CAD, Hyperlipidemia, Hypertension, Malignancy.  

denies: None, Reviewed & Not Pertinent, COPD, CVA, DM, Thyroid Disfunction, 

Other





- Past Medical History


Cardiac Medical History: 


   Denies: Hx Coronary Artery Disease, Hx Heart Attack, Hx Hypertension


Pulmonary Medical History: Reports: Hx Bronchitis, Hx Pneumonia


   Denies: Hx Asthma, Hx COPD


Neurological Medical History: Reports: Hx Migraine.  Denies: Hx Cerebrovascular 

Accident, Hx Seizures


Renal/ Medical History: Denies: Hx Peritoneal Dialysis


Malignancy Medical History: Reports: Hx Skin Cancer


GI Medical History: Reports: Hx Gastroesophageal Reflux Disease


Musculoskeletal Medical History: Denies Hx Arthritis, Reports Hx Fibromyalgia


Psychiatric Medical History: Reports: Hx Depression


Infectious Medical History: Reports: Hx MRSA


Past Surgical History: Reports: Hx Gynecologic Surgery - ablation, Hx Oral 

Surgery, Hx Orthopedic Surgery - right knee, Hx Tonsillectomy.  Denies: Hx 

Hysterectomy





- Immunizations


Hx Diphtheria, Pertussis, Tetanus Vaccination: Yes


Hx Pneumococcal Vaccination: 10/01/17





Review of Systems





- Review of Systems


Notes: 





REVIEW OF SYSTEMS





GEN: Denies fever, chills, weight loss


ENT: Denies sore throat, nasal discharge, ear pain


EYES: No spleen


CV: Denies chest pain, palpitations, edema


RESP: Denies cough, shortness of breath, wheezing


GI: Denies abdominal pain, nausea, vomiting, diarrhea


MSK: Denies joint pain/swelling, edema, 


SKIN: Denies rash, skin lesions


LYMPH: Denies swollen glands/lymph nodes


NEURO: Denies headache, focal weakness or numbness, dizziness


PSYCH: Denies depression, suicidal or homicidal ideation








PHYSICAL EXAMINATION





General: No acute distress, well-nourished


Head: Atraumatic, normocephalic


ENT: Mouth normal, oropharynx moist, bloody oozing from both nares, down the 

back of the throat as well, with no obvious source in the anterior septum on 

either side.  Cannot visualize septal defect or turbinates.


Eyes: Conjunctiva normal, pupils equal, lids normal


Neck: No JVD, supple, no guarding


CVS: Normal rate, regular rhythm, no murmurs


Resp: No resp distress, equal and normal breath sounds bilaterally


GI: Nondistended, soft, no tenderness to palpation, no rebound or guarding


Ext: No deformities, no edema, normal range of motion in upper and lower ext


Back: No CVA or midline TTP


Skin: No rash, warm


Lymphatic: No lymphadeopathy noted


Neuro: Awake, alert.  Face symmetric.  GCS 15.





Physical Exam





- Vital signs


Vitals: 


 











Temp Pulse Resp BP Pulse Ox


 


 97.7 F   70   16   113/69   100 


 


 11/07/18 00:13  11/07/18 00:13  11/07/18 00:13  11/07/18 00:13  11/07/18 00:13














Course





- Re-evaluation


Re-evalutation: 





11/07/18 00:57


, Located sinus patient on Coumadin with bilateral nosebleeds.  Will need 

posterior packing possibly bilaterally, will check INR.


11/07/18 02:04


Labs show no anemia, and an INR of 1.95.  Patient was reassessed and she has 

suctioned about 100 cc of blood from her nose and mouth in the interim.


Please see procedure noteI attempted to pack her nasal cavity but due to her 

abnormal anatomy was unable to advance either an anterior or posterior Rhino 

Rocket beyond about 2 cm.  I subsequently placed a Merocel on both sides.  I 

discussed the case with ENT on call Dr. Alcaraz who will come to the ED and 

evaluate the patient.


11/07/18 03:17


Seen and packed by ENT.  Patient is comfortable.  Will be discharged with 

antibiotics and Afrin.  We will up with her ENT and consent.  I have discussed 

with the patient there likely diagnosis, aftercare plan, follow-up plans and my 

usual and customary return precautions.  They verbalized understanding of this.





- Vital Signs


Vital signs: 


 











Temp Pulse Resp BP Pulse Ox


 


 97.7 F   70   16   111/70   100 


 


 11/07/18 00:13  11/07/18 00:13  11/07/18 02:01  11/07/18 02:01  11/07/18 02:01














- Laboratory


Result Diagrams: 


 11/07/18 00:58





 11/07/18 00:58


Laboratory results interpreted by me: 


 











  11/07/18 11/07/18 11/07/18





  00:58 00:58 00:58


 


RBC  3.55 L  


 


Hgb  11.4 L  


 


Hct  33.4 L  


 


Plt Count  134 L  


 


PT   23.3 H 


 


Chloride    108 H














- Diagnostic Test


Radiology reviewed: Pending, Image reviewed





Procedures





- Nosebleed Procedure


  ** Bilateral


Time completed: 01:45


Location: Anterior


Supplies used: Nasal tampon, Rhinorocket


Notes: 





Difficult placement.  Rhino Rocket were advanced and retracted because they 

were obstructed.  We are cells were inserted.  Anesthesia was 2% lidocaine with 

Afrin.  Soaked in tranexamic acid.





Discharge





- Discharge


Clinical Impression: 


 Epistaxis





Condition: Good


Disposition: HOME, SELF-CARE


Instructions:  Nosebleed Instructions ([facility])


Additional Instructions: 


Follow-up with your ENT doctor in [place]


Prescriptions: 


Oxymetazoline HCl [Afrin] 20 ml NS Q8HP PRN #30 drops


 PRN Reason: 


Cephalexin Monohydrate [Keflex 500 mg Capsule] 500 mg PO Q6H 5 Days  capsule


Referrals: 


LUCIA MCDOWELL PA-C [Primary Care Provider] - Follow up as needed

## 2018-11-07 NOTE — CONSULTATION REPORT E
Consultation Report



NAME: MARIANA ZAVALA

MRN:  [id number]               : 1965      AGE: 53Y

DATE: 2018    309  A



TO:   AMY PAULINO MD



FROM: KINGSLEY AVILA M.D.

      Requesting Physician



HISTORY:

A 53-year-old female who presented to the Emergency Room with epistaxis. 

Attempts to stop the epistaxis by the ER staff were unsuccessful and

Otolaryngology was consulted for evaluation and treatment.  The patient

has a history of having antiphospholipid syndrome which is a

hypocoagulability state and is on Coumadin.  She is being followed by an

otolaryngologist in [place] and recently had a septal button placed

secondary to a nasoseptal perforation.  The septal button was removed

about a week ago.  It started bleeding and some packing was placed into

the left nasal cavity.  The patient removed this packing a couple of days

ago and that resulted in the epistaxis that she could not stop, so she

presented to the Emergency Room for the epistaxis.



PERTINENT REVIEW OF SYSTEMS:

Negative.



PAST MEDICAL HISTORY:

antiphospholipid syndrome.



PHYSICAL EXAM:

GENERAL:  Patient is in no apparent distress.



NOSE:  Packing is noted in the bilateral nasal cavities.  The left side is

flush with the nasal seal.  The right nasal cavity, the packing appears to

be half out of the nasal cavity but no active bleeding is noted.



ORAL CAVITY/OROPHARYNX:  Dry.  Appears to be a clot just superior to the

soft palate, but again no active bleeding is noted.



NECK:  Exam is negative.



CARDIOVASCULAR:  Exam is negative.



PULMONARY:  Exam is negative.



GI:  Exam is negative.



NEUROLOGIC:  Negative.



SKIN:  Normal.



EYES:  Normal.



PROCEDURE:

The left nasal pack was removed and a clot was noted posterior to the

nasoseptal perforation.  This was suctioned and then there was also active

bleeding noted coming from the same area at the posterior aspect of the

septal perforation.  Next, a Merocel pack coated with bacitracin was

placed into the left nasal cavity back to the choanae.  This was then

infiltrated with Afrin nasal spray for vasoconstriction.  After placement

of the pack, the bleeding had stopped.  There was no longer bleeding

coming from that left side and view of the oral cavity and oropharynx was

dry.  No evidence of active bleeding.  There was a clot just at the

superior aspect of the soft palate.  Prior to placing the pack, pledgets

soaked with 4% cocaine were placed in each nasal cavity for approximately

4 minutes and then they were removed.  The patient tolerated the procedure

well.



ASSESSMENT:

1.   EPISTAXIS.

2.   ANTIPHOSPHOLIPID SYNDROME.



PLAN:

1.   The diagnosis and treatment plan discussed with the patient who voiced

     understanding.

2.   I discussed the treatment plan with the Emergency Room physician and

     recommend the patient be discharged on oral antibiotics and to apply

     Afrin to that nasal pack every 3 to 4 hours.

3.   The patient is going to follow up with her ENT in [place] for pack removal

     in 5 days.  I recommend the pack stay in for at least 5 days.





DICTATING PHYSICIAN: AMY PAULINO M.D.





[id number]                  DT: 2018    [id number]

PHY#: [id number]            DD: 2018    0950

ID:   [id number]           JOB#: [id number]      ACCT: [id number]



cc:AMY PAULINO MD

>







MTDD

## 2018-11-07 NOTE — PDOC H&P
History of Present Illness


Admission Date/PCP: 


  11/07/18 04:30





  LUCIA MCDOWELL PA-C





Patient complains of: Nosebleed


History of Present Illness: 


MARIANA ZAVALA is a 53 year old female with a past medical history of 

complicated maxillary sinusitis with sinus surgery and revision, recurrent 

epistaxis, lupus anticoagulant, opiate dependent chronic pain, fibromyalgia and 

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  Patient presents with 

12 hours of slow and progressive bleeding from both nostrils.  Patient had 

packing until 3 days ago following sinus surgery with button removal.  She 

denies fever chills nausea or vomiting.


Emergency room provider is unsuccessful with Rhino Rocket and Afrin.  ENT Dr. Arias is consulted for persistent posterior oropharynx bleeding.  She is 

referred to the hospitalist for admission.  Patient denies recent change in 

medications.








Past Medical History


Cardiac Medical History: 


   Denies: Coronary Artery Disease, Myocardial Infarction, Hypertension


Pulmonary Medical History: Reports: Bronchitis, Pneumonia


   Denies: Asthma, Chronic Obstructive Pulmonary Disease (COPD)


Neurological Medical History: Reports: Migraine


   Denies: Seizures


Malignancy Medical History: Reports: Skin Cancer


GI Medical History: Reports: Gastroesophageal Reflux Disease


Musculoskeltal Medical History: Reports: Fibromyalgia


   Denies: Arthritis


Psychiatric Medical History: Reports: Depression


Hematology: 


   Denies: Anemia


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





Past Surgical History


Past Surgical History: Reports: Orthopedic Surgery - right knee, Tonsillectomy


   Denies: Hysterectomy





Social History


Information Source: Patient, Emergency Med Personnel, [facility] Records


Smoking Status: Never Smoker


Frequency of Alcohol Use: None


Drugs: None





- Advance Directive


Resuscitation Status: Full Code





Family History


Family History: Arthritis, CAD, Hyperlipidemia, Hypertension, Malignancy.  

denies: None, Reviewed & Not Pertinent, COPD, CVA, DM, Thyroid Disfunction, 

Other


Parental Family History Reviewed: Yes


Children Family History Reviewed: Yes


Sibling(s) Family History Reviewed.: Yes





Medication/Allergy


Home Medications: 








Calcium Carb/Vit D3/Minerals [Calcium 1,200 Mg Tablet Chew] 1 each PO DAILY 03/ 26/12 


Cyclobenzaprine HCl [Flexeril 10 Mg Tablet] 10 mg PO PRN PRN 03/26/12 


Esomeprazole Mag Trihydrate [Nexium] 20 mg PO DAILY 03/26/12 


Fluticasone Propionate [Flonase Nasal Spray 50 Mcg/Spray] 1 spray NASL BID PRN 

03/26/12 


Hydroxychloroquine Sulfate [Plaquenil 200 Mg Tablet] 200 mg PO DAILY 03/26/12 


Multivits W-Ca,Fe,Other Min [Multiple Vitamins For Women] 1 each PO DAILY 03/26/ 12 


Sertraline HCl [Zoloft 50 Mg Tablet] 50 mg PO DAILY 03/26/12 


Topiramate [Topamax] 100 mg PO TID 03/26/12 


Hydrocodone/Acetaminophen [Vicodin 5-300 mg Tablet] 1 - 2 tab PO ASDIR PRN #15 

tab 09/08/14 


Ondansetron HCl [Zofran 4 mg Tablet] 1 - 2 tab PO Q4H PRN #10 tablet 09/08/14 


Acetaminophen [Tylenol Extra Strength] 500 mg PO ASDIR PRN 04/29/15 


Lactobacillus Acidophilus [Florajen] 460 mg PO DAILY 04/29/15 


Pseudoephedrine HCl [Sudafed 12-Hour] 1 tab PO ASDIR PRN 04/29/15 


Rizatriptan Benzoate [Maxalt] 10 mg PO ASDIR PRN 04/29/15 


Warfarin Sodium 4 mg PO ASDIR 04/29/15 


Guaifenesin 400 mg PO DAILY 11/16/17 


Ranitidine HCl [Zantac 75 mg Tablet] 75 mg PO BID 11/16/17 


Warfarin Sodium 5 mg PO ASDIR 11/16/17 


Cephalexin Monohydrate [Keflex 500 mg Capsule] 500 mg PO Q6H 5 Days  capsule 11/ 07/18 


Oxymetazoline HCl [Afrin] 20 ml NS Q8HP PRN #30 drops 11/07/18 








Allergies/Adverse Reactions: 


 





diphenhydramine HCl [From Benadryl] Allergy (Severe, Verified 11/17/17 08:57)


 HEART RACING, RESTLESS LEG


metoclopramide HCl [From Reglan] Allergy (Severe, Verified 11/17/17 08:57)


 HEART RACING, RESTLESS LEG


prochlorperazine maleate [From Compazine] Allergy (Severe, Verified 11/17/17 08:

57)


 HEART RACING, RESTLESS LEG


promethazine HCl [From Phenergan] Allergy (Severe, Verified 11/17/17 08:57)


 HEART RACING RESTLESS LEG


Sulfa (Sulfonamide Antibiotics) Allergy (Severe, Verified 11/17/17 08:57)


 Anaphylaxis


doxycycline [Doxycycline] Allergy (Mild, Verified 11/17/17 08:57)


 upset stomach


latex [Latex] Allergy (Mild, Verified 11/17/17 08:57)


 Pruritis


oxycodone HCl [From Percocet] Allergy (Mild, Verified 11/17/17 08:57)


 upset stomach


cefdinir [From Omnicef] Allergy (Verified 11/17/17 08:57)


 











Review of Systems


Constitutional: ABSENT: chills, fever(s), headache(s), weight gain, weight loss


Eyes: ABSENT: visual disturbances


Ears: ABSENT: hearing changes


Cardiovascular: ABSENT: chest pain, dyspnea on exertion, edema, orthropnea, 

palpitations


Respiratory: ABSENT: cough, hemoptysis


Gastrointestinal: ABSENT: abdominal pain, constipation, diarrhea, hematemesis, 

hematochezia, nausea, vomiting


Genitourinary: ABSENT: dysuria, hematuria


Musculoskeletal: ABSENT: joint swelling


Integumentary: ABSENT: rash, wounds


Neurological: ABSENT: abnormal gait, abnormal speech, confusion, dizziness, 

focal weakness, syncope


Psychiatric: ABSENT: anxiety, depression, homidical ideation, suicidal ideation


Endocrine: ABSENT: cold intolerance, heat intolerance, polydipsia, polyuria


Hematologic/Lymphatic: ABSENT: easy bleeding, easy bruising





Physical Exam


Vital Signs: 


 











Temp Pulse Resp BP Pulse Ox


 


 97.7 F   70   18   135/84 H  99 


 


 11/07/18 00:13  11/07/18 00:13  11/07/18 03:01  11/07/18 03:01  11/07/18 03:01











General appearance: PRESENT: cooperative, mild distress, thin, well-developed, 

well-nourished


Head exam: PRESENT: atraumatic, normocephalic


Eye exam: PRESENT: conjunctiva pink, EOMI, PERRLA.  ABSENT: scleral icterus


Ear exam: PRESENT: normal external ear exam


Mouth exam: PRESENT: moist, tongue midline, other - Nasal packing in place, 

suctioning blood clots


Throat exam: PRESENT: other


Neck exam: ABSENT: carotid bruit, JVD, lymphadenopathy, thyromegaly


Respiratory exam: PRESENT: clear to auscultation yony.  ABSENT: accessory muscle 

use, rales, rhonchi, wheezes


Cardiovascular exam: PRESENT: RRR.  ABSENT: diastolic murmur, rubs, systolic 

murmur


Pulses: PRESENT: normal dorsalis pedis pul


Vascular exam: PRESENT: normal capillary refill


GI/Abdominal exam: PRESENT: normal bowel sounds, soft.  ABSENT: distended, 

guarding, mass, organolmegaly, rebound, tenderness


Rectal exam: PRESENT: deferred


Extremities exam: PRESENT: full ROM.  ABSENT: calf tenderness, clubbing, pedal 

edema


Neurological exam: PRESENT: alert, awake, oriented to person, oriented to place

, oriented to time, oriented to situation, CN II-XII grossly intact.  ABSENT: 

motor sensory deficit


Psychiatric exam: PRESENT: appropriate affect, normal mood.  ABSENT: homicidal 

ideation, suicidal ideation


Skin exam: PRESENT: dry, intact, warm.  ABSENT: cyanosis, rash





Assessment & Plan





- Diagnosis


(1) Sinus pain


Is this a current diagnosis for this admission?: Yes   


Plan: 


Fentanyl as needed, empiric antibiotic, follow-up ENT consult








(2) Epistaxis


Is this a current diagnosis for this admission?: Yes   


Plan: 


Follow-up ENT consult








(3) Lupus anticoagulant disorder


Is this a current diagnosis for this admission?: Yes   


Plan: 


Bleeding to spite subtherapeutic INR, consult hematologist Dr. Lugo








- Time


Time Spent: 30 to 50 Minutes

## 2018-11-07 NOTE — PROGRESS NOTE E
Progress Note



NAME: MARIANA ZAVALA

MRN: [id number]

: 1965             AGE: 53Y

DATE:  2018                    ROOM: 309



SUBJECTIVE:

I evaluated the patient earlier this morning in the emergency room.  I

placed an anterior nasal pack, and she is being evaluated for followup. 

The patient states that she has not had any more bleeding since the pack

was placed and she denies any postnasal drip.  She denies coughing up any

blood.  She states that her last drip pad was placed a little bit over an

hour ago and has not soaked through.



OBJECTIVE:

GENERAL:  Patient in no apparent distress.



NOSE:  The nasal packs are in bilaterally.  No evidence of active

bleeding.  A drip pad is in place with some serosanguineous drainage,

appears to be very mild.



ORAL CAVITY/OROPHARYNX:  No evidence of bleeding in the posterior pharynx.

It is dry.



ASSESSMENT:

EPISTAXIS WITH BILATERAL ANTERIOR NASAL PACKS.  NO EVIDENCE OF ACTIVE

BLEED.



PLAN:

1.  The diagnosis and treatment plan were discussed with the patient.

2.  Recommend the patient be placed on a broad-spectrum antibiotic.  This

was discussed with the nursing staff to pass on to the attending

physician.

3.  Recommend spraying Afrin nasal spray to each nasal pack every 3-4

hours.

4.  Recommend placement of NAEEM hose since the patient will be in a

recumbent position.

5.  Activity level is low.  No strenuous activity.  No bending over.  The

head should always be kept higher than the level of the heart.

6.  The patient is going to follow up with her otolaryngologist in [place]

next week for pack removal.





DICTATING PHYSICIAN:  AMY PAULINO M.D.





[id number]                  DT: 2018    1351

PHY#: [id number]            DD: 2018    1343

ID:   [id number]           JOB#: [id number]       ACCT: [id number]



cc:

>

## 2018-11-08 LAB
ADD MANUAL DIFF: NO
BASOPHILS # BLD AUTO: 0 10^3/UL (ref 0–0.2)
BASOPHILS NFR BLD AUTO: 0.7 % (ref 0–2)
EOSINOPHIL # BLD AUTO: 0.2 10^3/UL (ref 0–0.6)
EOSINOPHIL NFR BLD AUTO: 2.9 % (ref 0–6)
ERYTHROCYTE [DISTWIDTH] IN BLOOD BY AUTOMATED COUNT: 13.3 % (ref 11.5–14)
HCT VFR BLD CALC: 34.8 % (ref 36–47)
HGB BLD-MCNC: 11.8 G/DL (ref 12–15.5)
INR PPP: 2.07
LYMPHOCYTES # BLD AUTO: 2.1 10^3/UL (ref 0.5–4.7)
LYMPHOCYTES NFR BLD AUTO: 31.3 % (ref 13–45)
MCH RBC QN AUTO: 32 PG (ref 27–33.4)
MCHC RBC AUTO-ENTMCNC: 34 G/DL (ref 32–36)
MCV RBC AUTO: 94 FL (ref 80–97)
MONOCYTES # BLD AUTO: 0.6 10^3/UL (ref 0.1–1.4)
MONOCYTES NFR BLD AUTO: 9.3 % (ref 3–13)
NEUTROPHILS # BLD AUTO: 3.7 10^3/UL (ref 1.7–8.2)
NEUTS SEG NFR BLD AUTO: 55.8 % (ref 42–78)
PLATELET # BLD: 143 10^3/UL (ref 150–450)
PROTHROMBIN TIME: 24.3 SEC (ref 11.4–15.4)
RBC # BLD AUTO: 3.7 10^6/UL (ref 3.72–5.28)
TOTAL CELLS COUNTED % (AUTO): 100 %
WBC # BLD AUTO: 6.7 10^3/UL (ref 4–10.5)

## 2018-11-08 RX ADMIN — AMOXICILLIN AND CLAVULANATE POTASSIUM SCH TAB: 500; 125 TABLET, FILM COATED ORAL at 14:50

## 2018-11-08 RX ADMIN — TOPIRAMATE SCH MG: 100 TABLET, FILM COATED ORAL at 10:21

## 2018-11-08 RX ADMIN — DEXAMETHASONE SODIUM PHOSPHATE PRN MG: 10 INJECTION INTRAMUSCULAR; INTRAVENOUS at 16:59

## 2018-11-08 RX ADMIN — CETIRIZINE HYDROCHLORIDE SCH MG: 10 TABLET, FILM COATED ORAL at 21:21

## 2018-11-08 RX ADMIN — MAGNESIUM OXIDE TAB 400 MG (241.3 MG ELEMENTAL MG) SCH MG: 400 (241.3 MG) TAB at 17:51

## 2018-11-08 RX ADMIN — GABAPENTIN SCH MG: 300 CAPSULE ORAL at 21:19

## 2018-11-08 RX ADMIN — AMOXICILLIN AND CLAVULANATE POTASSIUM SCH TAB: 500; 125 TABLET, FILM COATED ORAL at 21:19

## 2018-11-08 RX ADMIN — CALCIUM SCH MG: 500 TABLET ORAL at 17:52

## 2018-11-08 RX ADMIN — MULTIVITAMIN TABLET SCH TAB: TABLET at 17:51

## 2018-11-08 RX ADMIN — MAGNESIUM OXIDE TAB 400 MG (241.3 MG ELEMENTAL MG) SCH MG: 400 (241.3 MG) TAB at 10:21

## 2018-11-08 RX ADMIN — TOPIRAMATE SCH MG: 100 TABLET, FILM COATED ORAL at 21:21

## 2018-11-08 RX ADMIN — DOCUSATE SODIUM SCH: 100 CAPSULE, LIQUID FILLED ORAL at 10:22

## 2018-11-08 RX ADMIN — FENTANYL CITRATE PRN MCG: 50 INJECTION INTRAMUSCULAR; INTRAVENOUS at 04:45

## 2018-11-08 RX ADMIN — AMOXICILLIN AND CLAVULANATE POTASSIUM SCH TAB: 500; 125 TABLET, FILM COATED ORAL at 05:42

## 2018-11-08 RX ADMIN — FENTANYL CITRATE PRN MCG: 50 INJECTION INTRAMUSCULAR; INTRAVENOUS at 09:44

## 2018-11-08 RX ADMIN — GUAIFENESIN SCH MG: 600 TABLET, EXTENDED RELEASE ORAL at 10:24

## 2018-11-08 RX ADMIN — ACETAMINOPHEN PRN MG: 325 TABLET ORAL at 07:43

## 2018-11-08 RX ADMIN — DEXAMETHASONE SODIUM PHOSPHATE PRN MG: 10 INJECTION INTRAMUSCULAR; INTRAVENOUS at 12:39

## 2018-11-08 RX ADMIN — DEXAMETHASONE SODIUM PHOSPHATE PRN MG: 10 INJECTION INTRAMUSCULAR; INTRAVENOUS at 21:12

## 2018-11-08 RX ADMIN — GABAPENTIN SCH: 300 CAPSULE ORAL at 10:22

## 2018-11-08 RX ADMIN — GABAPENTIN SCH: 300 CAPSULE ORAL at 22:21

## 2018-11-08 RX ADMIN — DOCUSATE SODIUM SCH MG: 100 CAPSULE, LIQUID FILLED ORAL at 17:51

## 2018-11-08 RX ADMIN — FENTANYL CITRATE PRN MCG: 50 INJECTION INTRAMUSCULAR; INTRAVENOUS at 17:26

## 2018-11-08 RX ADMIN — HYDROXYCHLOROQUINE SULFATE SCH MG: 200 TABLET, FILM COATED ORAL at 10:21

## 2018-11-08 NOTE — CONSULTATION REPORT E
Consultation Report



NAME: MARIANA ZAVALA

MRN:  [id number]               : 1965      AGE: 53Y

DATE: 2018              ROOM: 309  A



TO:   LANEY URBINA M.D.



FROM: KINGSLEY AVILA M.D.

      Requesting Physician



REASON FOR CONSULTATION:

Patient on Coumadin with nose bleeding.



HISTORY OF PRESENT ILLNESS:

The patient is a 53-year-old who was admitted into the hospital for

nosebleed.  She had presented with slow progressive bleeding from both

nostrils.  The nose was then packed for days; however, the bleeding

persisted.  She was referred to the hospitalist for admission.  Since she

has been in the hospital her nose is still being packed.  Coumadin was

discontinued.  She is anxious and worried.  One of her concerns is that

she may have underlying lupus and other systemic dysfunctions that might

be causing her nosebleeds.  She has had no fever.  Her weight has been

relatively stable.



PAST MEDICAL HISTORY:

As stated above, included recent history of nosebleeds.  She tells that

she has had surgery to the nose in the past.  She had a recent procedure

done in [place] by ENT and since then she has had the continued bleeding

from her nose.  History of thrombophilia.  She has been on chronic

anticoagulation.  She has a family history of DVT.  She tested positive

for antiphospholipid antibiotics and was started on anticoagulation since

then and has remained on anticoagulation.  Denies any history of prior DVT

or pulmonary embolism.



PHYSICAL EXAMINATION:

She is a middle-aged woman.  She is thin, not acutely ill looking.  Her

nose is packed.



DIAGNOSTICS:

Her labs from  show a white count of 5.8, hemoglobin 11.4,

platelet count 134.  BUN is 17, creatinine 0.8.  INR on  was

1.96.  INR on  was 2.07.



IMPRESSION AND PLAN:

The patient is a 53-year-old with a significant family history of

thrombophilia.  She had antiphospholipid antibodies and was started on

anticoagulation which has continued to date.  She is normally very

compliant with her Coumadin checks and INR has been between 2 and 3.  I

agree with holding her Coumadin during this period; if there is concern

for blood clot a Angora filter would be appropriate.  If her INR does

not return to normal she may benefit from vitamin K.  I explained to the

patient that at this time it was prudent to hold the Coumadin and her

fears of pulmonary embolus can be addressed with a Angora filter.  I

will be glad to follow up as an outpatient following her discharge if she

otherwise remains clinically stable.



I thank you for this consultation and allowing me to be part of her care.





DICTATING PHYSICIAN: LANEY URBINA M.D.





[id number]                  DT: 2018    1344

PHY#: [id number]            DD: 2018    1337

ID:   [id number]           JOB#: [id number]      ACCT: [id number]



cc:LANEY URBINA M.D.

>

## 2018-11-09 PROCEDURE — 3E0234Z INTRODUCTION OF SERUM, TOXOID AND VACCINE INTO MUSCLE, PERCUTANEOUS APPROACH: ICD-10-PCS | Performed by: INTERNAL MEDICINE

## 2018-11-09 RX ADMIN — DEXAMETHASONE SODIUM PHOSPHATE PRN MG: 10 INJECTION INTRAMUSCULAR; INTRAVENOUS at 07:56

## 2018-11-09 RX ADMIN — ACETAMINOPHEN PRN MG: 325 TABLET ORAL at 23:31

## 2018-11-09 RX ADMIN — DEXAMETHASONE SODIUM PHOSPHATE PRN MG: 10 INJECTION INTRAMUSCULAR; INTRAVENOUS at 01:12

## 2018-11-09 RX ADMIN — GUAIFENESIN SCH: 600 TABLET, EXTENDED RELEASE ORAL at 10:33

## 2018-11-09 RX ADMIN — LANSOPRAZOLE SCH MG: 15 TABLET, ORALLY DISINTEGRATING, DELAYED RELEASE ORAL at 06:20

## 2018-11-09 RX ADMIN — FENTANYL CITRATE PRN MCG: 50 INJECTION INTRAMUSCULAR; INTRAVENOUS at 07:56

## 2018-11-09 RX ADMIN — AMOXICILLIN AND CLAVULANATE POTASSIUM SCH: 500; 125 TABLET, FILM COATED ORAL at 17:19

## 2018-11-09 RX ADMIN — TOPIRAMATE SCH: 100 TABLET, FILM COATED ORAL at 10:33

## 2018-11-09 RX ADMIN — MAGNESIUM OXIDE TAB 400 MG (241.3 MG ELEMENTAL MG) SCH: 400 (241.3 MG) TAB at 10:33

## 2018-11-09 RX ADMIN — FENTANYL CITRATE PRN MCG: 50 INJECTION INTRAMUSCULAR; INTRAVENOUS at 01:15

## 2018-11-09 RX ADMIN — TOPIRAMATE SCH: 100 TABLET, FILM COATED ORAL at 15:33

## 2018-11-09 RX ADMIN — GABAPENTIN SCH: 300 CAPSULE ORAL at 10:33

## 2018-11-09 RX ADMIN — CETIRIZINE HYDROCHLORIDE SCH: 10 TABLET, FILM COATED ORAL at 21:17

## 2018-11-09 RX ADMIN — DEXAMETHASONE SODIUM PHOSPHATE PRN MG: 10 INJECTION INTRAMUSCULAR; INTRAVENOUS at 21:16

## 2018-11-09 RX ADMIN — DOCUSATE SODIUM SCH: 100 CAPSULE, LIQUID FILLED ORAL at 17:19

## 2018-11-09 RX ADMIN — DEXAMETHASONE SODIUM PHOSPHATE PRN MG: 10 INJECTION INTRAMUSCULAR; INTRAVENOUS at 12:26

## 2018-11-09 RX ADMIN — CALCIUM SCH: 500 TABLET ORAL at 17:25

## 2018-11-09 RX ADMIN — MAGNESIUM OXIDE TAB 400 MG (241.3 MG ELEMENTAL MG) SCH: 400 (241.3 MG) TAB at 17:19

## 2018-11-09 RX ADMIN — TOPIRAMATE SCH MG: 100 TABLET, FILM COATED ORAL at 21:16

## 2018-11-09 RX ADMIN — MAGNESIUM OXIDE TAB 400 MG (241.3 MG ELEMENTAL MG) SCH: 400 (241.3 MG) TAB at 15:33

## 2018-11-09 RX ADMIN — GABAPENTIN SCH MG: 300 CAPSULE ORAL at 21:21

## 2018-11-09 RX ADMIN — AMOXICILLIN AND CLAVULANATE POTASSIUM SCH: 500; 125 TABLET, FILM COATED ORAL at 06:16

## 2018-11-09 RX ADMIN — HYDROXYCHLOROQUINE SULFATE SCH: 200 TABLET, FILM COATED ORAL at 10:33

## 2018-11-09 RX ADMIN — AMOXICILLIN AND CLAVULANATE POTASSIUM SCH: 500; 125 TABLET, FILM COATED ORAL at 21:17

## 2018-11-09 RX ADMIN — DOCUSATE SODIUM SCH: 100 CAPSULE, LIQUID FILLED ORAL at 10:33

## 2018-11-09 RX ADMIN — DEXAMETHASONE SODIUM PHOSPHATE PRN MG: 10 INJECTION INTRAMUSCULAR; INTRAVENOUS at 17:22

## 2018-11-09 RX ADMIN — MULTIVITAMIN TABLET SCH: TABLET at 17:25

## 2018-11-09 NOTE — PDOC DISCHARGE SUMMARY
General





- Admit/Disc Date/PCP


Admission Date/Primary Care Provider: 


  11/07/18 04:30





  LUCIA MCDOWELL PA-C





Discharge Date: 11/09/18





- Discharge Diagnosis


(1) Epistaxis


Is this a current diagnosis for this admission?: Yes   





(2) Lupus anticoagulant disorder


Is this a current diagnosis for this admission?: Yes   





- Additional Information


Resuscitation Status: Full Code


Home Medications: 








Alendronate Sodium [Fosamax 70 mg Tablet] 70 mg PO PARTIDA@1000 11/07/18 


Calcium Carbonate [Os-Jose 500 mg Tablet (Oyster-Shell)] 500 mg PO DAILY 11/07/ 18 


Cetirizine HCl [Zyrtec 10 mg Tablet] 10 mg PO DAILY 11/07/18 


Cyclobenzaprine HCl [Flexeril 10 mg Tablet] 10 mg PO Q8HP PRN MDD PT USUALLY 

ONLY TAKES QHS 11/07/18 


Esomeprazole Magnesium [Nexium 24Hr] 20 mg PO QAM 11/07/18 


Gabapentin [Neurontin 300 mg Capsule] 300 mg PO Q12 11/07/18 


Guaifenesin [Mucinex] 600 mg PO QAM 11/07/18 


Hydroxychloroquine Sulfate [Plaquenil 200 mg Tablet] 200 mg PO DAILY 11/07/18 


Magnesium Oxide [Mag-Ox 400 mg Tablet] 400 mg PO BID 11/07/18 


Mv-Min/Iron/Folic/Calcium/Vitk [One-A-Day Women's Tablet] 1 each PO DAILY 11/07/ 18 


Rizatriptan Benzoate [Maxalt] 10 mg PO ASDIR PRN MDD 2 TABS 11/07/18 


Topiramate [Topamax 100 Mg Tablet] 100 mg PO QAM 11/07/18 


Topiramate [Topamax 100 Mg Tablet] 200 mg PO QHS 11/07/18 


Warfarin Sodium [Coumadin 4 mg Tablet] 4 mg PO MOTUWETH@2200 11/07/18 


Warfarin Sodium [Coumadin 5 mg Tablet] 5 mg PO SUFRSA@2200 11/07/18 











History of Present Illness


History of Present Illness: 


MARIANA ZAVALA is a 53 year old female with a past medical history of 

complicated maxillary sinusitis with sinus surgery and revision, recurrent 

epistaxis, lupus anticoagulant, opiate dependent chronic pain, fibromyalgia and 

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  Patient presents with 

12 hours of slow and progressive bleeding from both nostrils.  Patient had 

packing until 3 days ago following sinus surgery with button removal.  She 

denies fever chills nausea or vomiting.


Emergency room provider is unsuccessful with Arvind Moore and Orest[facility].  ENT Dr. Arias is consulted for persistent posterior oropharynx bleeding.  She is 

referred to the hospitalist for admission.  Patient denies recent change in 

medications.














Hospital Course


Hospital Course: 


his is a 53 years old  female patient admitted with recurrent 

epistaxis.  Patient had surgery on her nasal septum and it is complicated by 

recurrent epistaxis.  The epistaxis gets worse because patient has been on 

Coumadin for antiphospholipid syndrome.  This morning I seen patient resting in 

bed she is not in pain or distress.  I discussed the case with her hematologist 

who recommended IVC filter.  The vascular surgeon Dr. Perdomo deferred doing 

the IVC filter after he discussed with this patient his hematologist.  

Currently patient is stable she is not in pain or distress her hematocrit and 

hemoglobin is stable at 9.8.  Patient is stable enough to be discharged today.  

Patient advised to see her primary hematologist and her and surgeon at [place].





Physical Exam


Vital Signs: 


 











Temp Pulse Resp BP Pulse Ox


 


 98.1 F   63   16   91/40 L  100 


 


 11/09/18 07:42  11/09/18 07:42  11/09/18 07:42  11/09/18 07:42  11/09/18 07:42








 Intake & Output











 11/08/18 11/09/18 11/10/18





 06:59 06:59 06:59


 


Intake Total 1279 455 


 


Balance 1279 455 


 


Weight 54.8 kg 53.7 kg 











General appearance: PRESENT: no acute distress


Head exam: PRESENT: atraumatic


Eye exam: PRESENT: conjunctiva pink


Mouth exam: PRESENT: dry mucosa


Neck exam: ABSENT: carotid bruit, JVD, lymphadenopathy, thyromegaly


GI/Abdominal exam: PRESENT: normal bowel sounds, soft.  ABSENT: distended, 

guarding, mass, organolmegaly, rebound, tenderness


Neurological exam: PRESENT: alert, awake, oriented to time, oriented to 

situation





Results


Laboratory Results: 


 





 11/08/18 04:16 











Qualifiers





- *


PATIENT BEING DISCHARGED WITH ANY OF THE FOLLOWING DIAGNOSIS: No

## 2018-11-09 NOTE — PDOC PROGRESS REPORT
Subjective


Progress Note for:: 11/09/18


Subjective:: 


Patient is about to be discharged but she develops nausea vomiting and headache 

so the discharge is postponed for tomorrow.  This morning patient is scheduled 

to have IVC filter but Dr. Perdomo deferred to the procedure.


Reason For Visit: 


EPISTAXIS, LUPUS








Physical Exam


Vital Signs: 


 











Temp Pulse Resp BP Pulse Ox


 


 97.8 F   77   16   121/44 L  100 


 


 11/09/18 12:05  11/09/18 12:05  11/09/18 12:05  11/09/18 12:04  11/09/18 12:05








 Intake & Output











 11/08/18 11/09/18 11/10/18





 06:59 06:59 06:59


 


Intake Total 1279 455 


 


Balance 1279 455 


 


Weight 54.8 kg 53.7 kg 











General appearance: PRESENT: no acute distress


Head exam: PRESENT: atraumatic


Eye exam: PRESENT: conjunctiva pink


Mouth exam: PRESENT: moist


Respiratory exam: PRESENT: clear to auscultation yony.  ABSENT: rales, rhonchi, 

wheezes


GI/Abdominal exam: PRESENT: normal bowel sounds, soft.  ABSENT: distended, 

guarding, mass, organolmegaly, rebound, tenderness


Extremities exam: PRESENT: full ROM.  ABSENT: calf tenderness, clubbing, pedal 

edema


Neurological exam: PRESENT: alert, awake, oriented to time, oriented to 

situation





Results


Laboratory Results: 


 





 11/08/18 04:16 











Assessment & Plan





- Diagnosis


(1) Epistaxis


Is this a current diagnosis for this admission?: Yes   


Plan: 


Continue nasal pack and as needed Afrin spray.  Further definitive management 

per her primary ENT surgeon.








(2) Lupus anticoagulant disorder


Is this a current diagnosis for this admission?: Yes   


Plan: 


She continues to be on Coumadin currently it is on hold.  We consulted 

hematologist for recommendation.

## 2018-11-10 VITALS — SYSTOLIC BLOOD PRESSURE: 105 MMHG | DIASTOLIC BLOOD PRESSURE: 38 MMHG

## 2018-11-10 RX ADMIN — HYDROXYCHLOROQUINE SULFATE SCH MG: 200 TABLET, FILM COATED ORAL at 09:22

## 2018-11-10 RX ADMIN — OXYMETAZOLINE HYDROCHLORIDE PRN SPR: 5 SPRAY NASAL at 00:02

## 2018-11-10 RX ADMIN — LANSOPRAZOLE SCH MG: 15 TABLET, ORALLY DISINTEGRATING, DELAYED RELEASE ORAL at 07:01

## 2018-11-10 RX ADMIN — DOCUSATE SODIUM SCH MG: 100 CAPSULE, LIQUID FILLED ORAL at 09:21

## 2018-11-10 RX ADMIN — DEXAMETHASONE SODIUM PHOSPHATE PRN MG: 10 INJECTION INTRAMUSCULAR; INTRAVENOUS at 06:59

## 2018-11-10 RX ADMIN — AMOXICILLIN AND CLAVULANATE POTASSIUM SCH: 500; 125 TABLET, FILM COATED ORAL at 07:01

## 2018-11-10 RX ADMIN — SUMATRIPTAN SUCCINATE PRN MG: 100 TABLET ORAL at 09:22

## 2018-11-10 RX ADMIN — OXYMETAZOLINE HYDROCHLORIDE PRN SPR: 5 SPRAY NASAL at 08:39

## 2018-11-10 RX ADMIN — GABAPENTIN SCH MG: 300 CAPSULE ORAL at 09:22

## 2018-11-10 RX ADMIN — SUMATRIPTAN SUCCINATE PRN MG: 100 TABLET ORAL at 07:01

## 2018-11-10 RX ADMIN — TOPIRAMATE SCH MG: 100 TABLET, FILM COATED ORAL at 09:22

## 2018-11-10 RX ADMIN — GUAIFENESIN SCH: 600 TABLET, EXTENDED RELEASE ORAL at 09:21

## 2018-11-10 RX ADMIN — MAGNESIUM OXIDE TAB 400 MG (241.3 MG ELEMENTAL MG) SCH: 400 (241.3 MG) TAB at 09:20

## 2018-11-10 RX ADMIN — AMOXICILLIN AND CLAVULANATE POTASSIUM SCH TAB: 500; 125 TABLET, FILM COATED ORAL at 13:32

## 2018-11-10 NOTE — PROGRESS NOTE
Provider Note


Provider Note: 


This is brief addendum to discharge summary I myself dictated for Mrs. Isha Lopez.  Patient was supposed to be discharged yesterday but states she 

developed nausea vomiting and headache and the discharge was on hold.  This 

morning I seen patient propped up in bed she is awake alert oriented she is not 

in distress she limits her nausea vomiting and headache has subsided and she 

wants to go home.  Her vital signs are within normal limits and patient is 

stable enough to be discharged.  Patient advised to have a follow-up with her 

primary ENT surgeon and her primary oncologist as soon as possible.

## 2019-09-27 ENCOUNTER — HOSPITAL ENCOUNTER (OUTPATIENT)
Dept: HOSPITAL 62 - END | Age: 54
Discharge: HOME | End: 2019-09-27
Attending: INTERNAL MEDICINE
Payer: MEDICARE

## 2019-09-27 VITALS — SYSTOLIC BLOOD PRESSURE: 110 MMHG | DIASTOLIC BLOOD PRESSURE: 64 MMHG

## 2019-09-27 DIAGNOSIS — D68.61: ICD-10-CM

## 2019-09-27 DIAGNOSIS — Z79.899: ICD-10-CM

## 2019-09-27 DIAGNOSIS — K21.9: ICD-10-CM

## 2019-09-27 DIAGNOSIS — K29.50: Primary | ICD-10-CM

## 2019-09-27 DIAGNOSIS — K52.9: ICD-10-CM

## 2019-09-27 DIAGNOSIS — M32.9: ICD-10-CM

## 2019-09-27 DIAGNOSIS — Z79.82: ICD-10-CM

## 2019-09-27 PROCEDURE — 88305 TISSUE EXAM BY PATHOLOGIST: CPT

## 2019-09-27 PROCEDURE — 45380 COLONOSCOPY AND BIOPSY: CPT

## 2019-09-27 PROCEDURE — 43239 EGD BIOPSY SINGLE/MULTIPLE: CPT

## 2019-09-27 PROCEDURE — 88342 IMHCHEM/IMCYTCHM 1ST ANTB: CPT

## 2019-09-27 PROCEDURE — 00813 ANES UPR LWR GI NDSC PX: CPT

## 2019-09-27 NOTE — OPERATIVE REPORT
Operative Report


DATE OF SURGERY: 09/27/19


Operative Report: 





The risks, benefits and alternatives of the procedure including the risk of 

bleeding, perforation requiring surgery have been explained to the patient in 

detail and informed consent has been obtained.  The patient has taken back to 

the endoscopy suite and placed in a left, lateral decubital position.  Timeout 

was called.  Propofol medication is administered.  Rectal examination is done 

which did not reveal any masses, tears or fissures.  An Olympus videoscope was 

introduced into the patient's rectum.  The scope was then carefully advanced all

the way to the cecum.  The cecum was identified by the usual anatomical 

landmarks including the ileocecal valve as well as the appendiceal office.  

Photodocumentation is obtained.  The scope was then sequentially pulled back via

the various segments of the colon including the ascending colon, hepatic 

flexure, transverse colon, splenic flexure, descending colon finding to the 

rectosigmoid portions of the colon.  Retroflexion maneuvers performed.


The risks benefits and alternatives of the procedure explained to the patient in

detail and informed consent is obtained.A GIF Olympus video scope was inserted 

into the patient's mouth and hypopharynx ,the esophagus is identified intubated 

and insufflated ,the scope was then advanced through the esophagus stomach and 

duodenum, retroflexion maneuver is done, the esophagus stomach and first and 

second portions of the duodenum examined.


PREOPERATIVE DIAGNOSIS: Change of bowel habits, gastroesophageal reflux disease


POSTOPERATIVE DIAGNOSIS: Redundant colon.  Right colon inflammation status post 

biopsy.  Gastritis status post biopsy


OPERATION: Colonoscopy with biopsy.  EGD with biopsy


SURGEON: HAFSA KEATING


ANESTHESIA: LMAC


TISSUE REMOVED OR ALTERED: As noted above.


COMPLICATIONS: 





None.


ESTIMATED BLOOD LOSS: None.


INTRAOPERATIVE FINDINGS: As noted above.


PROCEDURE: 





Patient tolerated the procedure well.


No immediate postprocedure complications are noted.


Patient is discharged in good condition.


Discharge date 9/27/2019.


Discharge diet: Regular.


Discharge activity: Regular.


2 to 3-week follow-up to discuss findings.


Patient is instructed to call the office or proceed to the emergency room should

there be any further problems or questions.


Wait on the pathology.

## 2019-11-18 ENCOUNTER — HOSPITAL ENCOUNTER (EMERGENCY)
Dept: HOSPITAL 62 - ER | Age: 54
Discharge: HOME | End: 2019-11-18
Payer: MEDICARE

## 2019-11-18 VITALS — DIASTOLIC BLOOD PRESSURE: 64 MMHG | SYSTOLIC BLOOD PRESSURE: 104 MMHG

## 2019-11-18 DIAGNOSIS — R51: ICD-10-CM

## 2019-11-18 DIAGNOSIS — H53.8: ICD-10-CM

## 2019-11-18 DIAGNOSIS — R04.0: Primary | ICD-10-CM

## 2019-11-18 DIAGNOSIS — Z86.14: ICD-10-CM

## 2019-11-18 LAB
ADD MANUAL DIFF: NO
ALBUMIN SERPL-MCNC: 4.7 G/DL (ref 3.5–5)
ALP SERPL-CCNC: 85 U/L (ref 38–126)
ANION GAP SERPL CALC-SCNC: 10 MMOL/L (ref 5–19)
AST SERPL-CCNC: 44 U/L (ref 14–36)
BASOPHILS # BLD AUTO: 0 10^3/UL (ref 0–0.2)
BASOPHILS NFR BLD AUTO: 0.7 % (ref 0–2)
BILIRUB DIRECT SERPL-MCNC: 0.1 MG/DL (ref 0–0.4)
BILIRUB SERPL-MCNC: 0.4 MG/DL (ref 0.2–1.3)
BUN SERPL-MCNC: 19 MG/DL (ref 7–20)
CALCIUM: 10 MG/DL (ref 8.4–10.2)
CHLORIDE SERPL-SCNC: 110 MMOL/L (ref 98–107)
CO2 SERPL-SCNC: 24 MMOL/L (ref 22–30)
EOSINOPHIL # BLD AUTO: 0.1 10^3/UL (ref 0–0.6)
EOSINOPHIL NFR BLD AUTO: 2.4 % (ref 0–6)
ERYTHROCYTE [DISTWIDTH] IN BLOOD BY AUTOMATED COUNT: 12.9 % (ref 11.5–14)
GLUCOSE SERPL-MCNC: 80 MG/DL (ref 75–110)
HCT VFR BLD CALC: 39.1 % (ref 36–47)
HGB BLD-MCNC: 13.2 G/DL (ref 12–15.5)
LYMPHOCYTES # BLD AUTO: 1.5 10^3/UL (ref 0.5–4.7)
LYMPHOCYTES NFR BLD AUTO: 24.1 % (ref 13–45)
MCH RBC QN AUTO: 33 PG (ref 27–33.4)
MCHC RBC AUTO-ENTMCNC: 33.8 G/DL (ref 32–36)
MCV RBC AUTO: 98 FL (ref 80–97)
MONOCYTES # BLD AUTO: 0.4 10^3/UL (ref 0.1–1.4)
MONOCYTES NFR BLD AUTO: 6.8 % (ref 3–13)
NEUTROPHILS # BLD AUTO: 4.1 10^3/UL (ref 1.7–8.2)
NEUTS SEG NFR BLD AUTO: 66 % (ref 42–78)
PLATELET # BLD: 132 10^3/UL (ref 150–450)
POTASSIUM SERPL-SCNC: 4.4 MMOL/L (ref 3.6–5)
PROT SERPL-MCNC: 7.8 G/DL (ref 6.3–8.2)
RBC # BLD AUTO: 4 10^6/UL (ref 3.72–5.28)
TOTAL CELLS COUNTED % (AUTO): 100 %
WBC # BLD AUTO: 6.2 10^3/UL (ref 4–10.5)

## 2019-11-18 PROCEDURE — 99284 EMERGENCY DEPT VISIT MOD MDM: CPT

## 2019-11-18 PROCEDURE — 70491 CT SOFT TISSUE NECK W/DYE: CPT

## 2019-11-18 PROCEDURE — 85025 COMPLETE CBC W/AUTO DIFF WBC: CPT

## 2019-11-18 PROCEDURE — 36415 COLL VENOUS BLD VENIPUNCTURE: CPT

## 2019-11-18 PROCEDURE — 80053 COMPREHEN METABOLIC PANEL: CPT

## 2019-11-18 PROCEDURE — 96375 TX/PRO/DX INJ NEW DRUG ADDON: CPT

## 2019-11-18 PROCEDURE — 70487 CT MAXILLOFACIAL W/DYE: CPT

## 2019-11-18 PROCEDURE — 96365 THER/PROPH/DIAG IV INF INIT: CPT

## 2019-11-18 NOTE — ER DOCUMENT REPORT
ED General





- General


Chief Complaint: Nose Bleed


Stated Complaint: NOSE BLEED, PAIN


Time Seen by Provider: 11/18/19 11:14


Primary Care Provider: 


LUCIA MCDOWELL PA-C [Primary Care Provider] - Follow up as needed


Mode of Arrival: Ambulatory


Notes: 





54-year-old female presents emergency department complaining of persistent 

epistaxis for quite some time.  Patient states that she had her right nostril 

cauterized on Thursday last week at [facility] and she has been having some bleeding 

from her bilateral nares ever since.  Over the past 2 to 3 days she has also 

developed a dull aching headache that is located in her forehead and in her 

cheekbones.  States it is getting progressively worse.  Patient has also noticed

a small amount of redness and swelling below her left eye.  She is taking Keflex

for a vaginal cyst and she is afraid she is having a reaction to this Keflex.  





Patient states that she is actually a long history of recurrent nosebleeds that 

lead her to be taken off of warfarin last year.  Patient previously been taking 

warfarin due to her lupus antiphospholipid syndrome.  Patient has never had a 

DVT or PE.  Patient states that she called [facility] this morning and was told

to go to the emergency department if her nose was continuing to bleed.





Denies any dizziness or lightheadedness.  Denies any bruising across the rest of

her body, denies any rash across the rest of her body aside from below her left 

eye.


TRAVEL OUTSIDE OF THE [place] IN LAST 30 DAYS: No





- Related Data


Allergies/Adverse Reactions: 


                                        





diphenhydramine HCl [From Benadryl] Allergy (Severe, Verified 11/18/19 11:13)


   HEART RACING, RESTLESS LEG


metoclopramide HCl [From Reglan] Allergy (Severe, Verified 11/18/19 11:13)


   HEART RACING, RESTLESS LEG


prochlorperazine maleate [From Compazine] Allergy (Severe, Verified 11/18/19 

11:13)


   HEART RACING, RESTLESS LEG


promethazine HCl [From Phenergan] Allergy (Severe, Verified 11/18/19 11:13)


   HEART RACING RESTLESS LEG


Sulfa (Sulfonamide Antibiotics) Allergy (Severe, Verified 11/18/19 11:13)


   Anaphylaxis


tramadol Allergy (Severe, Verified 11/18/19 11:13)


   N/V/MIGRAINES


doxycycline [Doxycycline] Allergy (Mild, Verified 11/18/19 11:13)


   upset stomach


latex [Latex] Allergy (Mild, Verified 11/18/19 11:13)


   Pruritis


oxycodone HCl [From Percocet] Allergy (Mild, Verified 11/18/19 11:13)


   upset stomach


cefdinir [From Omnicef] Allergy (Verified 11/18/19 11:13)


   








Home Medications: lupus.  fibromyalgia.  migraines.  antiphosphlipid syndrome





Past Medical History





- General


Information source: Patient





- Social History


Smoking Status: Unknown if Ever Smoked


Chew tobacco use (# tins/day): No


Frequency of alcohol use: None


Drug Abuse: None


Family History: Arthritis, CAD, Hyperlipidemia, Hypertension, Malignancy.  

denies: None, Reviewed & Not Pertinent, COPD, CVA, DM, Thyroid Disfunction, 

Other


Patient has suicidal ideation: No


Patient has homicidal ideation: No





- Past Medical History


Cardiac Medical History: 


   Denies: Hx Coronary Artery Disease, Hx Heart Attack, Hx Hypertension


Pulmonary Medical History: 


   Denies: Hx Asthma, Hx Bronchitis, Hx COPD - NODULES OF SCAR TISSUE IN LUNGS, 

Hx Pneumonia


Neurological Medical History: Reports: Hx Migraine.  Denies: Hx Cerebrovascular 

Accident, Hx Seizures


Renal/ Medical History: Denies: Hx Peritoneal Dialysis


Malignancy Medical History: Reports: Hx Skin Cancer


GI Medical History: Reports: Hx Gastroesophageal Reflux Disease


Musculoskeletal Medical History: Comment Only Hx Arthritis - FIBROMYALGIA, 

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


Psychiatric Medical History: Reports: Hx Depression


Infectious Medical History: Reports: Hx MRSA


Past Surgical History: Reports: Hx Gynecologic Surgery - ablation, Hx Oral 

Surgery, Hx Orthopedic Surgery - right knee, Hx Tonsillectomy.  Denies: Hx Hyst

erectomy





- Immunizations


Hx Diphtheria, Pertussis, Tetanus Vaccination: No


Hx Pneumococcal Vaccination: 10/01/18





Review of Systems





- Review of Systems


Constitutional: No symptoms reported


EENT: See HPI


Cardiovascular: No symptoms reported.  denies: Syncope, Dizziness, Lightheaded


Respiratory: No symptoms reported


Gastrointestinal: No symptoms reported


-: Yes All other systems reviewed and negative





Physical Exam





- Vital signs


Vitals: 


                                        











Temp Pulse Resp BP Pulse Ox


 


 97.8 F   59 L  18   108/60   100 


 


 11/18/19 11:00  11/18/19 11:00  11/18/19 11:00  11/18/19 11:00  11/18/19 11:00











Interpretation: Normal





- Notes


Notes: 





GENERAL: Alert, interacts well.  No acute distress.


HEAD: Normocephalic, atraumatic


EYES: Pupils equal, round and reactive to light, extraocular movements intact.  

Minimal very light erythema inferior to the left eye just below the left lid, 

minimal swelling.  No proptosis to the eyeball itself.


ENT: Oral mucosa moist, tongue midline.  Small ulcerations to the mucosa of the 

right nostril particularly along the nasal septum.  Minimal bleeding noted, no 

blood noted to the left nostril, small amount of blood in the posterior 

oropharynx on the right-hand side.


NECK: Full range of motion, supple, trachea midline.  No JVD.


LUNGS: Clear to auscultation bilaterally, no wheezes, rales or rhonchi, no 

respiratory distress.


HEART: Regular rate and rhythm, no murmurs, gallops, rubs.  


ABDOMEN: Soft, nontender, nondistended, bowel sounds present in all 4 quadrants.

 


EXTREMITIES: Moves all 4 extremities spontaneously, no edema, radial and 

dorsalis pedis pulses 2/4 bilaterally.  No cyanosis.  


NEUROLOGICAL: Alert and oriented x3, normal speech.


PSYCH: Normal mood, normal affect.


SKIN: Warm, Dry, normal turgor, no rashes or lesions noted aside from below the 

left eye.





Course





- Re-evaluation


Re-evalutation: 





11/18/19 14:36


CBC shows very slight thrombocytopenia with platelets of 132, hemoglobin is 

normal at this point multiple days of epistaxis, CMP grossly unremarkable, CT 

scan of the facial bones ordered from triage does not show any signs of acute 

sinusitis or acute process around the eyes.  It does show "prominent veins in 

the posterior neck.  Is there a history of jugular vein thrombosis?".  As the p

atient is hypercoagulable and is not anticoagulated and the patient has 

sensation of pressure in her head and face by do believe jugular vein thrombosis

is a possibility so I ordered a CT of the neck with IV contrast to look for 

jugular vein thrombosis and this was negative.  Patient has been given IV 

acetaminophen to help with her headache, she is neurologically intact, there are

no signs of meningismus, no suspicion for meningitis.  No evidence of systemic 

allergic reaction, only has one area of erythema just below the left eye.  

Keflex will not be changed.  Currently patient has TXA soaked packing up her 

nose and we are waiting to see how this works.


11/18/19 15:32


TXA was removed, no further bleeding, will observe for 30 minutes and if no 

further bleeding will discharged home.





Counseled regarding humidifier, Afrin, direct pressure and Vaseline topically to

decrease risk of bleeding.





Patient does mention that she feels like she is been having slightly more blurry

vision for the past couple of days.  Visual acuity will be checked.


11/18/19 16:42


See nursing notes for visual acuity, it is grossly normal.  Bleeding has 

stopped.  Patient will be discharged home.





- Vital Signs


Vital signs: 


                                        











Temp Pulse Resp BP Pulse Ox


 


 98.0 F   60   18   104/64   100 


 


 11/18/19 16:40  11/18/19 16:40  11/18/19 16:40  11/18/19 16:40  11/18/19 16:40














- Laboratory


Result Diagrams: 


                                 11/18/19 11:26





                                 11/18/19 11:26


Laboratory results interpreted by me: 


                                        











  11/18/19 11/18/19





  11:26 11:26


 


MCV  98 H 


 


Plt Count  132 L 


 


Chloride   110 H


 


AST   44 H














Discharge





- Discharge


Clinical Impression: 


 Recurrent epistaxis





Condition: Stable


Disposition: HOME, SELF-CARE


Additional Instructions: 


Today we stopped your bleeding by applying topical Tranexemic acid (TXA) to your

nose on a gauze pad.  Do not blow your nose, pick your nose or put anything in 

your nose aside from a little vaseline.  If your nose starts bleeding again 

please gently blow your nose, squirt Afrin up both nostrils and then hold firm 

pressure on your nose for 15 minutes constantly without peaking.  You may then 

released pressor but she should not blow your nose at all.  If the bleeding has 

stopped there is no need to return to the emergency department.





If you cannot stop the bleeding please return to the emergency department and we

will likely end up packing her nose with a nasal tampon.





Please call the ear nose and throat doctors who initially cauterized your nose 

tomorrow morning to give them follow-up.





Please use a humidifier, stop taking ibuprofen for now and starting taking 

acetaminophen for pain.  


Referrals: 


LUCIA MCDOWELL PA-C [Primary Care Provider] - Follow up as needed

## 2019-11-18 NOTE — RADIOLOGY REPORT (SQ)
EXAM DESCRIPTION:  CT SOFT TISSUE NECK WITH



COMPLETED DATE/TIME:  11/18/2019 1:40 pm



REASON FOR STUDY:  poss jugular vein thrombosis, see prior CT report left facial swelling, epistaxis,
 patient has lupus with antiphospholipid syndrome



COMPARISON:  Two-view chest 2/26/2016, 3/11/2013



TECHNIQUE:  Post IV contrasted scanning from skull base through lung apices with review of bone, soft
 tissue and lung windows.  Reconstructed coronal and sagittal MPR images reviewed.  All images stored
 on PACS.

All CT scanners at this facility use dose modulation, iterative reconstruction, and/or weight based d
osing when appropriate to reduce radiation dose to as low as reasonably achievable (ALARA).

[facility]: Dose Right  [facility]: CareDose    [facility]: Dose Right    [facility]: Teradose 4D    [facility]: Smart Technologies



CONTRAST TYPE AND DOSE:  contrast/concentration: Isovue 350.00 mg/ml; Total Contrast Delivered: 75.0 
ml; Total Saline Delivered: 55.0 ml



RENAL FUNCTION:  Creatinine 0.8



RADIATION DOSE:  CT Rad equipment meets quality standard of care and radiation dose reduction techniq
ues were employed. CTDIvol: 6.0 mGy. DLP: 185 mGy-cm. .



LIMITATIONS:  None.



FINDINGS:  SKULL BASE: Intact.  Normal emissary veins are seen in the occipital region of doubtful cl
inical significance.  There is normal contrast enhancement of the transverse and sigmoid sinuses, int
ernal and external jugular veins bilaterally.  Inferior brain parenchyma in the field of view is unre
markable.

MAJOR SALIVARY GLANDS: No solid or cystic masses.  No inflammatory changes.

LYMPHADENOPATHY: No adenopathy.

MUCOSAL MASSES OR ASYMMETRY: No mucosal masses or asymmetry.

LARYNX/CORDS: No abnormal findings.

VASCULAR STRUCTURES: The major vessels are patent.

LUNG APICES: At the bottom edge of the field of view on lung windows, a 7 mm nodule is present on axi
al image 94/105.  A 13 mm nodule is present on axial image 101/105.  These nodules are unchanged by p
jace films dating back to 3/11/2013.

BONES: Intact.

THYROID: Normal size.  7 mm and 6 mm hypodensities in the right thyroid.  Outpatient follow-up thyroi
d ultrasound recommended

PARANASAL SINUSES: Clear.

OTHER: Findings discussed with 



IMPRESSION:  No vascular anomalies.

Benign nodules right upper lobe.

No findings to explain history of epistaxis.

Incidental finding of subcentimeter thyroid nodules for which outpatient follow-up thyroid ultrasound
 is recommended



TECHNICAL DOCUMENTATION:  JOB ID:  [id number]

Quality ID # 436: Final reports with documentation of one or more dose reduction techniques (e.g., Au
tomated exposure control, adjustment of the mA and/or kV according to patient size, use of iterative 
reconstruction technique)

 2011 [employer]- All Rights Reserved



Reading location - IP/workstation name: [email or web address]

## 2019-11-18 NOTE — RADIOLOGY REPORT (SQ)
EXAM DESCRIPTION:  CT FACIAL AREA WITH



COMPLETED DATE/TIME:  11/18/2019 12:41 pm



REASON FOR STUDY:  facial swelling



COMPARISON:  None.



TECHNIQUE:  Post contrast images through the facial bones and orbits windowed for bone and soft tissu
e.  Additional coronal and sagittal reconstructed images reviewed.  All images stored on PACS.

All CT scanners at this facility use dose modulation, iterative reconstruction, and/or weight based d
osing when appropriate to reduce radiation dose to as low as reasonably achievable (ALARA).

[facility]: Dose Right  [facility]: CareDose    [facility]: Dose Right    [facility]: Teradose 4D    [facility]: Smart Technologies



CONTRAST TYPE AND DOSE:  contrast/concentration: Isovue 350.00 mg/ml; Total Contrast Delivered: 50.0 
ml; Total Saline Delivered: 49.0 ml



RENAL FUNCTION:  BUN 19 creatinine 0.79



RADIATION DOSE:  CT Rad equipment meets quality standard of care and radiation dose reduction techniq
ues were employed. CTDIvol: 6.0 mGy. DLP: 138 mGy-cm. .



LIMITATIONS:  None.



FINDINGS:  FACIAL BONES: No fractures.

ORBITS: Intact.  No fracture.  Symmetric intact globes and retroorbital soft tissues.

PARANASAL SINUSES: Clear.  No significant mucosal thickening, mass or fluid. No nasal polyps. Maxilla
ry sinus outlets are patent.

SOFT TISSUES: There are prominent veins in the posterior neck.  Is there history of jugular vein thro
mbosis?

INFERIOR BRAIN: Limited view.  No acute findings.

OTHER: No other significant finding.



IMPRESSION:  No significant acute finding is seen in the face.  There are prominent veins in the post
erior neck raising concern for jugular vein thrombosis.



TECHNICAL DOCUMENTATION:  JOB ID:  [id number]

Quality ID # 436: Final reports with documentation of one or more dose reduction techniques (e.g., Au
tomated exposure control, adjustment of the mA and/or kV according to patient size, use of iterative 
reconstruction technique)

 2011 [employer]- All Rights Reserved



Reading location - IP/workstation name: [email or web address]

## 2019-11-18 NOTE — ER DOCUMENT REPORT
ED Medical Screen (RME)





- General


Chief Complaint: Nose Bleed


Stated Complaint: NOSE BLEED, PAIN


Time Seen by Provider: 11/18/19 11:14


Primary Care Provider: 


LUCIA MCDOWELL PA-C [Primary Care Provider] - Follow up as needed


Mode of Arrival: Ambulatory


Information source: Patient


Notes: 





54-year-old female with history of lupus fibromyalgia antiphospholipid syndrome 

presents emergency department with complaints of epistaxis.  Reports history of 

epistaxis.  Patient reports she was seen at the ENT at [facility] last 

Thursday.  They cauterized 2 areas on her nose.  Reports she had a button in 

place but had to be removed.  She reports increased pain since that time and 

this morning she had facial swelling.  Denies fever vomiting diarrhea.  She did 

contact the ENT but they did not have an appointment for her.








I have greeted and performed a rapid initial assessment of this patient.  A 

comprehensive ED assessment and evaluation of the patient, analysis of test r

esults and completion of the medical decision making process will be conducted 

by additional ED providers.


Dictation of this chart was performed using voice recognition software; 

therefore, there may be some unintended grammatical errors.


TRAVEL OUTSIDE OF THE [place] IN LAST 30 DAYS: No





- Related Data


Allergies/Adverse Reactions: 


                                        





diphenhydramine HCl [From Benadryl] Allergy (Severe, Verified 11/18/19 11:13)


   HEART RACING, RESTLESS LEG


metoclopramide HCl [From Reglan] Allergy (Severe, Verified 11/18/19 11:13)


   HEART RACING, RESTLESS LEG


prochlorperazine maleate [From Compazine] Allergy (Severe, Verified 11/18/19 

11:13)


   HEART RACING, RESTLESS LEG


promethazine HCl [From Phenergan] Allergy (Severe, Verified 11/18/19 11:13)


   HEART RACING RESTLESS LEG


Sulfa (Sulfonamide Antibiotics) Allergy (Severe, Verified 11/18/19 11:13)


   Anaphylaxis


tramadol Allergy (Severe, Verified 11/18/19 11:13)


   N/V/MIGRAINES


doxycycline [Doxycycline] Allergy (Mild, Verified 11/18/19 11:13)


   upset stomach


latex [Latex] Allergy (Mild, Verified 11/18/19 11:13)


   Pruritis


oxycodone HCl [From Percocet] Allergy (Mild, Verified 11/18/19 11:13)


   upset stomach


cefdinir [From Omnicef] Allergy (Verified 11/18/19 11:13)


   








Home Medications: lupus.  fibromyalgia.  migraines.  antiphosphlipid syndrome





Past Medical History





- Social History


Chew tobacco use (# tins/day): No


Frequency of alcohol use: None


Drug Abuse: None





- Past Medical History


Cardiac Medical History: 


   Denies: Hx Coronary Artery Disease, Hx Heart Attack, Hx Hypertension


Pulmonary Medical History: 


   Denies: Hx Asthma, Hx Bronchitis, Hx COPD - NODULES OF SCAR TISSUE IN LUNGS, 

Hx Pneumonia


Neurological Medical History: Reports: Hx Migraine.  Denies: Hx Cerebrovascular 

Accident, Hx Seizures


Renal/ Medical History: Denies: Hx Peritoneal Dialysis


Malignancy Medical History: Reports: Hx Skin Cancer


GI Medical History: Reports: Hx Gastroesophageal Reflux Disease


Musculoskeltal Medical History: Comment Only Hx Arthritis - FIBROMYALGIA, 

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


Psychiatric Medical History: Reports: Hx Depression


Infectious Medical History: Reports: Hx MRSA


Past Surgical History: Reports: Hx Gynecologic Surgery - ablation, Hx Oral 

Surgery, Hx Orthopedic Surgery - right knee, Hx Tonsillectomy.  Denies: Hx 

Hysterectomy





- Immunizations


Hx Diphtheria, Pertussis, Tetanus Vaccination: No





Physical Exam





- Vital signs


Vitals: 





                                        











Temp Pulse Resp BP Pulse Ox


 


 97.8 F   59 L  18   108/60   100 


 


 11/18/19 11:00  11/18/19 11:00 11/18/19 11:00 11/18/19 11:00  11/18/19 11:00














Course





- Vital Signs


Vital signs: 





                                        











Temp Pulse Resp BP Pulse Ox


 


 97.8 F   59 L  18   108/60   100 


 


 11/18/19 11:00 11/18/19 11:00  11/18/19 11:00  11/18/19 11:00  11/18/19 11:00














Doctor's Discharge





- Discharge


Referrals: 


LUCIA MCDOWELL PA-C [Primary Care Provider] - Follow up as needed

## 2020-07-16 ENCOUNTER — HOSPITAL ENCOUNTER (OUTPATIENT)
Dept: HOSPITAL 62 - WI | Age: 55
End: 2020-07-16
Attending: OBSTETRICS & GYNECOLOGY
Payer: MEDICARE

## 2020-07-16 DIAGNOSIS — Z12.31: Primary | ICD-10-CM

## 2020-07-16 PROCEDURE — 77067 SCR MAMMO BI INCL CAD: CPT

## 2020-07-16 NOTE — WOMENS IMAGING REPORT
EXAM DESCRIPTION:  BILAT SCREENING MAMMO W/CAD



IMAGES COMPLETED DATE/TIME:  7/16/2020 8:36 am



REASON FOR STUDY:  Z12.31 ENCOUNTER FOR SCREENING MAMMOGRAM FOR MALIGNANT NEOPLASM OF BREAST Z12.31  
ENCNTR SCREEN MAMMOGRAM FOR MALIGNANT NEOPLASM OF JEREMIAS



COMPARISON:  10/10/2017 and 7/21/2016.



EXAM PARAMETERS:  Standard craniocaudal and mediolateral oblique views of each breast recorded using 
digital acquisition.

Read with the assistance of CAD.

.[facility] - R2  Version 9.2



LIMITATIONS:  None.



FINDINGS:  No suspicious masses, suspicious calcifications or architectural distortion. No areas of c
oncern.



IMPRESSION:  NEGATIVE MAMMOGRAM.  BIRADS 1



BREAST DENSITY:  d. The breasts are extremely dense, which lowers the sensitivity of mammography.



BIRAD:  ASSESSMENT:  1 NEGATIVE



RECOMMENDATION:  ROUTINE SCREENING



COMMENT:  The patient has been notified of the results by letter per MQSA requirements. Additional no
tification policies are in place for contacting patient with suspicious or incomplete findings.

Quality ID #225: The American College of Radiology recommends an annual screening mammogram for women
 aged 40 years or over. This facility utilizes a reminder system to ensure that all patients receive 
reminder letters, and/or direct phone calls for appointments. This includes reminders for routine scr
eening mammograms, diagnostic mammograms, or other Breast Imaging Interventions when appropriate.  Th
is patient will be placed in the appropriate reminder system.



TECHNICAL DOCUMENTATION:  FINDING NUMBER: (1)

ASSESSMENT: (1)

JOB ID:  [id number]

 2011 [employer]- All Rights Reserved



Reading location - IP/workstation name: [email or web address]

## 2020-08-10 ENCOUNTER — HOSPITAL ENCOUNTER (OUTPATIENT)
Dept: HOSPITAL 62 - OD | Age: 55
End: 2020-08-10
Attending: PHYSICIAN ASSISTANT
Payer: MEDICARE

## 2020-08-10 DIAGNOSIS — M54.5: Primary | ICD-10-CM

## 2020-08-10 PROCEDURE — 72110 X-RAY EXAM L-2 SPINE 4/>VWS: CPT

## 2020-08-10 NOTE — RADIOLOGY REPORT (SQ)
EXAM DESCRIPTION:  LUMBAR SPINE COMPLETE



IMAGES COMPLETED DATE/TIME:  8/10/2020 12:05 pm



REASON FOR STUDY:  LOW BACK PAIN M54.5  LOW BACK PAIN



COMPARISON:  None.



NUMBER OF VIEWS:  Five views including obliques.



TECHNIQUE:  AP, lateral, oblique, and sacral radiographic images acquired of the lumbar spine.



LIMITATIONS:  None.



FINDINGS:  MINERALIZATION: Normal.

SEGMENTATION: Normal.  No transitional anatomy.

ALIGNMENT: Normal.

VERTEBRAE: Maintained height.  No fracture or worrisome bone lesion.

DISCS: Preserved height.  No significant osteophytes or end plate irregularity.

POSTERIOR ELEMENTS: Pedicles and facets are intact.  No pars defect or posterior arch defects.

HARDWARE: None in the spine.

PARASPINAL SOFT TISSUES: Normal.

PELVIS: Intact as visualized. No fractures or worrisome bone lesions. SI joints intact.

OTHER: No other significant finding.



IMPRESSION:  NORMAL 5 VIEW LUMBAR SPINE.



TECHNICAL DOCUMENTATION:  JOB ID:  [id number]

 2011 [employer]- All Rights Reserved



Reading location - IP/workstation name: [email or web address]

## 2020-10-12 ENCOUNTER — HOSPITAL ENCOUNTER (OUTPATIENT)
Dept: HOSPITAL 62 - RDC | Age: 55
End: 2020-10-12
Attending: NURSE PRACTITIONER
Payer: MEDICARE

## 2020-10-12 VITALS — DIASTOLIC BLOOD PRESSURE: 55 MMHG | SYSTOLIC BLOOD PRESSURE: 108 MMHG

## 2020-10-12 DIAGNOSIS — K21.9: ICD-10-CM

## 2020-10-12 DIAGNOSIS — M79.7: ICD-10-CM

## 2020-10-12 DIAGNOSIS — Z20.828: ICD-10-CM

## 2020-10-12 DIAGNOSIS — R19.7: ICD-10-CM

## 2020-10-12 DIAGNOSIS — J02.9: ICD-10-CM

## 2020-10-12 DIAGNOSIS — R68.83: ICD-10-CM

## 2020-10-12 DIAGNOSIS — Z88.6: ICD-10-CM

## 2020-10-12 DIAGNOSIS — J06.9: Primary | ICD-10-CM

## 2020-10-12 DIAGNOSIS — Z88.8: ICD-10-CM

## 2020-10-12 DIAGNOSIS — Z86.14: ICD-10-CM

## 2020-10-12 DIAGNOSIS — Z85.828: ICD-10-CM

## 2020-10-12 DIAGNOSIS — Z88.1: ICD-10-CM

## 2020-10-12 DIAGNOSIS — Z91.040: ICD-10-CM

## 2020-10-12 LAB
A TYPE INFLUENZA AG: NEGATIVE
B INFLUENZA AG: NEGATIVE

## 2020-10-12 PROCEDURE — 99201: CPT

## 2020-10-12 PROCEDURE — 87804 INFLUENZA ASSAY W/OPTIC: CPT

## 2020-10-12 PROCEDURE — 87070 CULTURE OTHR SPECIMN AEROBIC: CPT

## 2020-10-12 PROCEDURE — 99211 OFF/OP EST MAY X REQ PHY/QHP: CPT

## 2020-10-12 PROCEDURE — 87880 STREP A ASSAY W/OPTIC: CPT

## 2020-10-12 PROCEDURE — C9803 HOPD COVID-19 SPEC COLLECT: HCPCS

## 2020-10-12 PROCEDURE — 87635 SARS-COV-2 COVID-19 AMP PRB: CPT

## 2020-10-12 NOTE — ER RDC ASSESSMENT REPORT
Intake





- In the Last 14 days


Have you traveled outside [place]?: No


Have you been in close contact with someone CONFIRMED: No


Worked in Healthcare?: No





- Symptoms


Subjective Fever(Felt feverish): No


Chills: Yes


Muscule Aches: Yes


Runny Nose: No


Sore Throat: Yes


Cough (New or worsening chronic cough): No


Shortness of breath: No


Nausea or Vomiting: No


Headache: No


Abdominal Pain: No


Diarrhea(3 or more loose stools in last 24 hours): Yes





- Do you have any of the following


Chronic lung disease: Asthma or emphysema or COPD: No


Cystic Fibrosis: No


Diabetes: No


High Blood Pressure: No


Cardiovascular Disease: No


Chronic Kidney Disease: No


Chronic Liver Disease: No


Chronic blood disorder like Sickle Cell Disease: No


Weak immune system due to disease or medication: No


Neurologic condition that limits movement: No


Developmental delay - Moderate to Severe: No


Recent (within past 2 weeks) or current Pregnancy: No


Morbid Obesity (>100 pounds over ideal weight): No


Obesity Comment: 





Height 5 feet 5 inches weight 112 pounds.


Other Comment: 





Patient has a history of lupus





- Objective


Temperature: 98.0 F


Pulse Rate: 57


Respiratory Rate: 18


Blood Pressure: 108/55


O2 Sat by Pulse Oximetry: 98


Objective: 


Given above, testing performed: 
































If Testing Performed:


Test Specimen Type Sent to











General





- General


Information source: Patient


Notes: 





Patient here at [facility] for cover testing patient started to develop symptoms over a

week ago and is not sure if she has had any known positive exposures that she is

aware of.  Patient PCP is out of [facility] in [place] plans to follow-up with 

them today.





- Related Data


Allergies/Adverse Reactions: 


                                        





diphenhydramine HCl [From Benadryl] Allergy (Severe, Verified 11/18/19 11:13)


   HEART RACING, RESTLESS LEG


metoclopramide HCl [From Reglan] Allergy (Severe, Verified 11/18/19 11:13)


   HEART RACING, RESTLESS LEG


prochlorperazine maleate [From Compazine] Allergy (Severe, Verified 11/18/19 

11:13)


   HEART RACING, RESTLESS LEG


promethazine HCl [From Phenergan] Allergy (Severe, Verified 11/18/19 11:13)


   HEART RACING RESTLESS LEG


Sulfa (Sulfonamide Antibiotics) Allergy (Severe, Verified 11/18/19 11:13)


   Anaphylaxis


tramadol Allergy (Severe, Verified 11/18/19 11:13)


   N/V/MIGRAINES


doxycycline [Doxycycline] Allergy (Mild, Verified 11/18/19 11:13)


   upset stomach


latex [Latex] Allergy (Mild, Verified 11/18/19 11:13)


   Pruritis


oxycodone HCl [From Percocet] Allergy (Mild, Verified 11/18/19 11:13)


   upset stomach


cefdinir [From Omnicef] Allergy (Verified 11/18/19 11:13)


   











Past Medical History





- General


Information source: Patient





- Social History


Smoking Status: Never Smoker


Family History: Arthritis, CAD, Hyperlipidemia, Hypertension, Malignancy





- Past Medical History


Cardiac Medical History: 


   Denies: Hx Coronary Artery Disease, Hx Heart Attack, Hx Hypertension


Pulmonary Medical History: 


   Denies: Hx Asthma, Hx Bronchitis, Hx COPD - NODULES OF SCAR TISSUE IN LUNGS, 

Hx Pneumonia


Neurological Medical History: Reports: Hx Migraine.  Denies: Hx Cerebrovascular 

Accident, Hx Seizures


Renal/ Medical History: Denies: Hx Peritoneal Dialysis


Malignancy Medical History: Reports: Hx Skin Cancer


GI Medical History: Reports: Hx Gastroesophageal Reflux Disease


Musculoskeletal Medical History: Comment Only Hx Arthritis - FIBROMYALGIA, 

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


Psychiatric Medical History: Reports: Hx Depression


Infectious Medical History: Reports: Hx MRSA


Past Surgical History: Reports: Hx Gynecologic Surgery - ablation, Hx Oral 

Surgery, Hx Orthopedic Surgery - right knee, Hx Tonsillectomy.  Denies: Hx 

Hysterectomy, Hx Pacemaker





Physical Exam





- General


General appearance: Appears well, Alert


In distress: None


Notes: 





PHYSICAL EXAMINATION: 


GENERAL: Well-appearing and in no acute distress. 


HEAD: Atraumatic, normocephalic. 


EYES: sclera anicteric, conjunctiva are normal. 


ENT: nares patent. Moist mucous membranes. 


NECK: Normal range of motion, supple without lymphadenopathy 


LUNGS: CTAB and equal. No wheezes rales or rhonchi.  Respirations even and 

unlabored lung sounds clear.


HEART: Regular rate and rhythm without murmurs 


ABDOMEN: Soft, nontender, normal bowel sounds, no guarding. 


EXTREMITIES: Normal range of motion, no pitting edema. No cyanosis. 


NEUROLOGICAL: Cranial nerves grossly intact. Normal speech. Normal gait.


PSYCH: Normal mood, normal affect. 


SKIN: Warm, Dry, normal turgor, no rashes or lesions noted








Diagnostic Results


Laboratory Results: 


Patient informed of negative rapid strep and negative rapid flu results.  

Pending strep culture pending covid testing results.  Patient provided 

instructions regarding COVID to include: As a person under investigation for 

Covid 19, the [facility] of Health and Human Services, division 

of public health advises you to adhere to the following guidance until your test

results are reported to you.  If your test result is positive, you will receive 

additional information from your provider and your local health department at 

that time.


 


Remain at home until you are cleared by the health provider or public health 

authorities.


 


Keep a log of visitors to your home, notify any visitors to your home of your 

isolation status.


 


If you plan to move to a new address or leave the county, notify the local 

health department in your County.


 


Call your doctor or seek care if you have an urgent medical need.  Before 

seeking medical care, call ahead to get instructions from the provider before 

arriving at the medical office clinic or hospital.  Notify them that you are 

being tested for the virus that causes Covid 19 so that arrangements can be 

made, as necessary, to prevent transmission to others in the healthcare setting.

 Next, notify the local health department in your county.


 


If a medical emergency arises and you need to call 911, inform the first 

responders that you are being tested for the virus that causes Covid 19.  Next, 

notify the local health department in your [facility].





Patient Education/Counseling


Counseling/Education: 





Patient presents with upper respiratory symptoms worrisome for possible Covid 

19.  Patient does not have emergency worring symptoms such as difficulty 

breathing, shortness of breath, chest pain, pressure, confusion or cyanosis.  

Patient appears suitable for discharge.  Patient instructed to follow-up with 

PCP at [facility] in [facility]  To ED for persistent or worsening symptoms.  

Patient's vital signs are stable and patient is nontoxic in appearance.  Good 

return precautions have been discussed with patient, patient verbalized 

understanding and is agreeable with discharge plan of care at this time.





RDC Discharge





- Discharge


Condition: Stable


Disposition: Home; Selfcare

## 2020-11-07 ENCOUNTER — HOSPITAL ENCOUNTER (EMERGENCY)
Dept: HOSPITAL 62 - ER | Age: 55
LOS: 1 days | Discharge: HOME | End: 2020-11-08
Payer: MEDICARE

## 2020-11-07 DIAGNOSIS — M79.7: ICD-10-CM

## 2020-11-07 DIAGNOSIS — R19.7: ICD-10-CM

## 2020-11-07 DIAGNOSIS — Z88.8: ICD-10-CM

## 2020-11-07 DIAGNOSIS — H81.10: ICD-10-CM

## 2020-11-07 DIAGNOSIS — A08.4: Primary | ICD-10-CM
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
## 2020-11-07 DIAGNOSIS — R51.9: ICD-10-CM

## 2020-11-07 DIAGNOSIS — R10.9: ICD-10-CM

## 2020-11-07 DIAGNOSIS — T60.0X1A: ICD-10-CM

## 2020-11-07 DIAGNOSIS — R11.2: ICD-10-CM

## 2020-11-07 DIAGNOSIS — Z88.2: ICD-10-CM

## 2020-11-07 LAB
ADD MANUAL DIFF: NO
ALBUMIN SERPL-MCNC: 4.6 G/DL (ref 3.5–5)
ALP SERPL-CCNC: 82 U/L (ref 38–126)
ANION GAP SERPL CALC-SCNC: 11 MMOL/L (ref 5–19)
AST SERPL-CCNC: 33 U/L (ref 14–36)
BASOPHILS # BLD AUTO: 0 10^3/UL (ref 0–0.2)
BASOPHILS NFR BLD AUTO: 0.4 % (ref 0–2)
BILIRUB DIRECT SERPL-MCNC: 0.1 MG/DL (ref 0–0.4)
BILIRUB SERPL-MCNC: 0.4 MG/DL (ref 0.2–1.3)
BUN SERPL-MCNC: 17 MG/DL (ref 7–20)
CALCIUM: 10 MG/DL (ref 8.4–10.2)
CHLORIDE SERPL-SCNC: 107 MMOL/L (ref 98–107)
CO2 SERPL-SCNC: 21 MMOL/L (ref 22–30)
EOSINOPHIL # BLD AUTO: 0 10^3/UL (ref 0–0.6)
EOSINOPHIL NFR BLD AUTO: 0.6 % (ref 0–6)
ERYTHROCYTE [DISTWIDTH] IN BLOOD BY AUTOMATED COUNT: 13 % (ref 11.5–14)
GLUCOSE SERPL-MCNC: 122 MG/DL (ref 75–110)
HCT VFR BLD CALC: 35.4 % (ref 36–47)
HGB BLD-MCNC: 12.7 G/DL (ref 12–15.5)
LYMPHOCYTES # BLD AUTO: 1.1 10^3/UL (ref 0.5–4.7)
LYMPHOCYTES NFR BLD AUTO: 14.1 % (ref 13–45)
MCH RBC QN AUTO: 33.7 PG (ref 27–33.4)
MCHC RBC AUTO-ENTMCNC: 35.8 G/DL (ref 32–36)
MCV RBC AUTO: 94 FL (ref 80–97)
MONOCYTES # BLD AUTO: 0.4 10^3/UL (ref 0.1–1.4)
MONOCYTES NFR BLD AUTO: 5.1 % (ref 3–13)
NEUTROPHILS # BLD AUTO: 6.3 10^3/UL (ref 1.7–8.2)
NEUTS SEG NFR BLD AUTO: 79.8 % (ref 42–78)
PLATELET # BLD: 130 10^3/UL (ref 150–450)
POTASSIUM SERPL-SCNC: 3.8 MMOL/L (ref 3.6–5)
PROT SERPL-MCNC: 7.3 G/DL (ref 6.3–8.2)
RBC # BLD AUTO: 3.76 10^6/UL (ref 3.72–5.28)
TOTAL CELLS COUNTED % (AUTO): 100 %
WBC # BLD AUTO: 7.9 10^3/UL (ref 4–10.5)

## 2020-11-07 PROCEDURE — 99285 EMERGENCY DEPT VISIT HI MDM: CPT

## 2020-11-07 PROCEDURE — 80053 COMPREHEN METABOLIC PANEL: CPT

## 2020-11-07 PROCEDURE — 93005 ELECTROCARDIOGRAM TRACING: CPT

## 2020-11-07 PROCEDURE — 36415 COLL VENOUS BLD VENIPUNCTURE: CPT

## 2020-11-07 PROCEDURE — 93010 ELECTROCARDIOGRAM REPORT: CPT

## 2020-11-07 PROCEDURE — 74177 CT ABD & PELVIS W/CONTRAST: CPT

## 2020-11-07 PROCEDURE — 96374 THER/PROPH/DIAG INJ IV PUSH: CPT

## 2020-11-07 PROCEDURE — 96376 TX/PRO/DX INJ SAME DRUG ADON: CPT

## 2020-11-07 PROCEDURE — 85025 COMPLETE CBC W/AUTO DIFF WBC: CPT

## 2020-11-07 PROCEDURE — 71045 X-RAY EXAM CHEST 1 VIEW: CPT

## 2020-11-07 PROCEDURE — 96372 THER/PROPH/DIAG INJ SC/IM: CPT

## 2020-11-07 PROCEDURE — 96375 TX/PRO/DX INJ NEW DRUG ADDON: CPT

## 2020-11-07 PROCEDURE — 96361 HYDRATE IV INFUSION ADD-ON: CPT

## 2020-11-07 NOTE — ER DOCUMENT REPORT
ED General





- General


Stated Complaint: POSSIBLE POISONING


Time Seen by Provider: 20 20:49


Primary Care Provider: 


LUCIA MCDOWELL PA-C [Primary Care Provider] - 20


TRAVEL OUTSIDE OF THE [place] IN LAST 30 DAYS: No





- HPI


Context: 





55-year-old female presents to the emergency department complaining of nausea, 

vomiting and diarrhea that has been present for approximately 36 hours.  Patient

states she was staying at her daughter's home and a  came and 

treated the home for aunts.   told the patient that there appeared 

to be ants inside set of dresser drawers.  The patient states that she got the 

dresser drawers out into the garage and set off the insecticide aerosolized "b

omb" in the dresser drawers and states that she feels like she got a significant

exposure to the insecticide.  Patient states that she is felt and especially 

today and has basically slept all day, not even getting off the couch other than

to use the bathroom.  Patient states in addition to the nausea, vomiting 

diarrhea, she is also having a migraine headache and abdominal pain.  When asked

to describe the pain, patient states that the pain is a 8 on a scale of 0-5.  

Patient stated that she also has fibromyalgia and lupus.  Patient stated she 

"trying to find it out at home."  Patient denies shortness of breath,, chest 

pain, changes in vision, excessive lacrimation or salivation.  Patient states 

that any type of activity exacerbates the pain and she is found no relieving 

factors for the pain or the nausea, vomiting diarrhea.  Patient states she did 

take 1 dose of Imodium at home before coming to the ED.  Patient describes the 

headache as throbbing and the abdominal pain is crampy.  Patient denies fever, 

chills, history of Covid infection, history of exposure to known Covid positive 

persons or persons under investigation for COVID-19.  Patient brought a picture 

of the insecticide product with her.  This MD is instructed nursing to contact 

poison control.  The product is known as "sure kill" and contains pyrethrins. 


Associated symptoms: Other - See HPI


Exacerbated by: Other - See HPI


Relieved by: Other - See HPI





- Related Data


Allergies/Adverse Reactions: 


                                        





diphenhydramine HCl [From Benadryl] Allergy (Severe, Verified 19 11:13)


   HEART RACING, RESTLESS LEG


metoclopramide HCl [From Reglan] Allergy (Severe, Verified 19 11:13)


   HEART RACING, RESTLESS LEG


prochlorperazine maleate [From Compazine] Allergy (Severe, Verified 19 

11:13)


   HEART RACING, RESTLESS LEG


promethazine HCl [From Phenergan] Allergy (Severe, Verified 19 11:13)


   HEART RACING RESTLESS LEG


Sulfa (Sulfonamide Antibiotics) Allergy (Severe, Verified 19 11:13)


   Anaphylaxis


tramadol Allergy (Severe, Verified 19 11:13)


   N/V/MIGRAINES


doxycycline [Doxycycline] Allergy (Mild, Verified 19 11:13)


   upset stomach


latex [Latex] Allergy (Mild, Verified 19 11:13)


   Pruritis


oxycodone HCl [From Percocet] Allergy (Mild, Verified 19 11:13)


   upset stomach


cefdinir [From Omnicef] Allergy (Verified 19 11:13)


   











Past Medical History





- General


Information source: Patient





- Social History


Smoking Status: Never Smoker


Frequency of alcohol use: None


Drug Abuse: None


Lives with: Family


Family History: Arthritis, CAD, Hyperlipidemia, Hypertension, Malignancy


Patient has suicidal ideation: No


Patient has homicidal ideation: No





- Medical History


Notes: 





Patient states she has a history of lupus and fibromyalgia





- Past Medical History


Cardiac Medical History: 


   Denies: Hx Coronary Artery Disease, Hx Heart Attack, Hx Hypertension


Pulmonary Medical History: 


   Denies: Hx Asthma, Hx Bronchitis, Hx COPD - NODULES OF SCAR TISSUE IN LUNGS, 

Hx Pneumonia


Neurological Medical History: Reports: Hx Migraine.  Denies: Hx Cerebrovascular 

Accident, Hx Seizures


Renal/ Medical History: Denies: Hx Peritoneal Dialysis


Malignancy Medical History: Reports: Hx Skin Cancer


GI Medical History: Reports: Hx Gastroesophageal Reflux Disease


Musculoskeletal Medical History: Comment Only Hx Arthritis - FIBROMYALGIA, 

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


Psychiatric Medical History: Reports: Hx Depression


Infectious Medical History: Reports: Hx MRSA


Past Surgical History: Reports: Hx Gynecologic Surgery - ablation, Hx Oral 

Surgery, Hx Orthopedic Surgery - right knee, Hx Tonsillectomy.  Denies: Hx 

Hysterectomy, Hx Pacemaker





- Immunizations


Hx Diphtheria, Pertussis, Tetanus Vaccination: No


Hx Pneumococcal Vaccination: 10/01/18





Review of Systems





- Review of Systems


Constitutional: See HPI, Weakness


EENT: No symptoms reported


Cardiovascular: No symptoms reported


Respiratory: No symptoms reported


Gastrointestinal: Abdominal pain, Diarrhea, Nausea, Vomiting


Genitourinary: No symptoms reported


Female Genitourinary: No symptoms reported


Musculoskeletal: No symptoms reported


Skin: No symptoms reported


Hematologic/Lymphatic: No symptoms reported


Neurological/Psychological: Headaches


-: Yes All other systems reviewed and negative





Physical Exam





- Vital signs


Vitals: 


                                        











Resp Pulse Ox


 


 22 H  100 


 


 20 20:45  20 20:45














- Notes


Notes: 





CONSTITUTIONAL 


[Vital signs reviewed, Patient appears to be in no acute distress, Alert and 

oriented X 3.]


HEAD 


[Atraumatic, Normocephalic.]


EYES 


[Eyes are normal to inspection, No discharge from eyes, Extraocular muscles 

intact, Sclera are normal, Conjunctiva are normal.]


ENT 


[External Ears normal to inspection, Nose examination normal, Posterior pharynx 

normal, Mouth normal to inspection.]


NECK 


[Normal ROM, No jugular venous distention, No meningeal signs, .]


RESPIRATORY CHEST 


[Chest is nontender, Breath sounds normal, No respiratory distress.]


CARDIOVASCULAR 


[RRR, No murmurs, Normal S1 S2, No rub, No gallop.]


ABDOMEN 


[Abdomen is nontender, No pulsatile masses, No other masses, Bowel sounds 

normal, No distension, No peritoneal signs, No hernias.]


BACK 


[There is no CVA Tenderness, There is no tenderness to palpation, Normal 

inspection.]


UPPER EXTREMITY 


[Inspection normal, No cyanosis, No clubbing, No edema, 2+ radial pulses.]


LOWER EXTREMITY 


[Inspection normal, No cyanosis, No clubbing, No edema, No calf tenderness, 2+ 

femoral pulses.]


NEURO 


[No focal motor deficits, No focal sensory deficits, Speech normal.]


SKIN 


[Skin is warm, Skin is dry, Skin is normal color.]


PSYCHIATRIC 


[Normal affect. ]





Course





- Re-evaluation


Re-evalutation: 





20 23:14


Patient states her pain is better but her nausea is still present.  This MD is 

going to order another liter of fluid and 4 more milligrams of IV Zofran to see 

if we can relieve the patient's nausea.


20 00:15


Patient is complaining of persistent nausea and crampy abdominal pain at this 

time.


20 03:09


Patient states her headache and abdominal pain have improved.  Results of ED MSE

discussed with patient and patient's significant other.  All questions were a

nswered prior to discharge.  Emergency signs and symptoms, reasons to return to 

the emergency department discussed with patient and patient's significant other.


20 03:48


Patient went from sitting up in bed to pivoting into wheelchair, had sudden 

onset of nausea and vomiting.  Patient states that her symptoms do seem to be 

worse when she changes position and better when she is laying still.  Benign 

positional vertigo discussed with patient.  Plan at this point is to give the 

patient a scopolamine transdermal patch and a prescription for meclizine along 

with patient education.





- Vital Signs


Vital signs: 


                                        











Temp Pulse Resp BP Pulse Ox


 


 98.0 F      12   92/48 L  99 


 


 20 03:36     20 02:01  20 02:00  20 03:00














- Laboratory


Result Diagrams: 


                                 20 20:50





                                 20 20:50


Laboratory results interpreted by me: 


                                        











  20





  20:50 20:50


 


Hct  35.4 L 


 


MCH  33.7 H 


 


Plt Count  130 L 


 


Seg Neutrophils %  79.8 H 


 


Carbon Dioxide   21 L


 


Glucose   122 H














- Diagnostic Test


Radiology reviewed: Reports reviewed





- EKG Interpretation by Me


Additional EKG results interpreted by me: 





20 21:46


KG obtained on 2020 at 2131 hrs. was interpreted by this MD.  Findings: 

Sinus bradycardia, rate 58, normal axis, MO interval appears within normal limit

s, P waves preceding QRS complexes, QRS complexes appear narrow, , there 

are no obvious patterns of ST segment elevation, depression or reciprocal 

changes seen to suggest acute myocardial ischemia or infarction.  When compared 

to prior EKG from , no significant changes are seen.  Impression: Sinus 

bradycardia with nonspecific ST segments.





Discharge





- Discharge


Clinical Impression: 


 Viral gastroenteritis





Benign positional vertigo


Qualifiers:


 Laterality: unspecified laterality Qualified Code(s): H81.10 - Benign 

paroxysmal vertigo, unspecified ear





Condition: Stable


Disposition: HOME, SELF-CARE


Instructions:  Antinausea Medication ([facility]), Gastroenteritis (adult) ([facility])


Additional Instructions: 


Return to the Emergency Department without delay if any worse.











HOME CARE INSTRUCTIONS & INFORMATION:  Thank you for choosing us for your medica

l needs. We hope you're satisfied with the care you received.  After you leave, 

you must properly care for your problem and, at the same time, observe its 

progress.  Any condition can change.  Some illnesses can change rapidly over 

hours or days.  If your condition worsens, return to the Emergency Department or

see your physician promptly.





ABOUT YOUR X-RAYS AND EKG'S:   If you had an EKG or X-rays taken, they have been

read by the Emergency Physician. The X-rays and EKG's will also be read by a 

Radiologist or Cardiologist within 24 hours.  If discrepancies are noted, you 

will be notified by telephone.  Please be certain the ED has a correct telephone

number & address where you can be reached.  Also, realize that some fractures or

abnormalities do not show up on initial X-rays.  If your symptoms continue, see 

your physician.





ABOUT YOUR LABORATORY TEST:   If you had laboratory tests, the results have been

reviewed by the Emergency Physician.  Some test results (for example cultures) 

may not be available for several days.  You will be contacted if any test result

shows you need additional treatment.  Please be certain the ED has a correct 

telephone number and address where you can be reached.





ABOUT YOUR MEDICATIONS:  You will receive instructions on how to take your 

medicine on the prescription label you receive.  Additional information may be 

provided by the Pharmacy.  If you have questions afterwards, call the ED for 

clarification or further instructions.  Some prescribed medications may cause 

drowsiness.  Do not perform tasks such as driving a car or operating machinery 

without consulting your Pharmacist.  If you feel you need a refill of pain 

medication, your condition will need re-evaluation.  Please do not call for a 

refill of any medication.





ABOUT YOUR SIGNATURE:   Signature of this document acknowledges to followin. Understanding that you received emergency treatment and that you may be 

   released before al medical problems are known or treated. Please be certain  

   the ED has a correct phone number & address where you can be reached.


   2. Acknowledgement that you will arrange for follow-up care as recommended.


   3. Authorization for the Emergency Physician to provide information to your 

follow-up Physician in order to maximize your care.





AT ANY TIME, IF YOUR SYMPTOMS CHANGE SIGNIFICANTLY OR WORSEN OR YOU DEVELOP NEW 

SYMPTOMS, RETURN TO THE EMERGENCY DEPARTMENT IMMEDIATELY FOR RE-EVALUATION.





OUR GOAL IS TO PROVIDE EXCELLENT MEDICAL CARE!





WE HOPE THAT WE HAVE MET YOUR EXPECTATIONS DURING YOUR EMERGENCY DEPARTMENT 

VISIT AND THAT YOU FEEL YOU HAVE RECEIVED EXCELLENT CARE!





Vertigo





     You have experienced an episode of vertigo -- a whirling dizziness which 

may be accompanied by nausea and vomiting or staggering.  Vertigo is often 

caused by an irritation of the inner ear, in which case it is called 

labyrinthitis.  It can also be a symptom of a degenerating inner ear, nerve 

damage, or brain injury.  Your physician has evaluated you to determine whether 

any further testing is necessary.


     Vertigo is often treated with dramamine or meclizine.  These medications 

are helpful, but stronger medication may be needed if you are vomiting.  Rest in

bed.  You should not drive or operate machinery until completely better.  It may

take one to three weeks for recovery.


     If there are new symptoms, such as decreased hearing or vision, severe 

headache, weakness or faintness, or confusion, call the physician.





Prescriptions: 


Ondansetron [Zofran Odt 4 mg Tablet] 8 mg PO Q8HP PRN #16 tab.rapdis


 PRN Reason: NAUSEA AND VOMITING


Meclizine HCl [Antivert 25 mg Tablet] 25 mg PO TID PRN #21 tablet


 PRN Reason: 


Referrals: 


LUCIA MCDOWELL PA-C [Primary Care Provider] - 20

## 2020-11-07 NOTE — RADIOLOGY REPORT (SQ)
CHEST X-RAY 1 VIEW on 11/7/2020 at 9:19 PM 



CLINICAL INDICATION: Insecticide exposure



COMPARISON: 10/16/2016



FINDINGS: The lungs are clear. Cardiac, hilar and mediastinal

contours are within normal limits. Pulmonary vascularity is

within normal limits. No bony abnormality is noted.



IMPRESSION: No active disease.

## 2020-11-08 VITALS — DIASTOLIC BLOOD PRESSURE: 48 MMHG | SYSTOLIC BLOOD PRESSURE: 92 MMHG

## 2020-11-08 NOTE — RADIOLOGY REPORT (SQ)
CT abdomen and pelvis with contrast on  11/8/2020 at 2:12 AM 



CLINICAL INDICATION: Periumbilical abdominal pain



TECHNIQUE: Multiple axial images are obtained throughout the

abdomen and pelvis following the administration of IV contrast,

79 mL of Omnipaque 350contrast was administered intravenously

without complication. This exam was performed according to our

departmental dose-optimization program, which includes automated

exposure control, adjustment of the mA and/or kV according to

patient size and/or use of iterative reconstruction technique.

Total DLP is 497.78 mGy*cm.



COMPARISON:  9/8/2014



FINDINGS:

Abdomen: There is mild linear atelectasis or scarring in the

right lung base. The lung bases are otherwise clear. Gallbladder

is mildly distended, consider ultrasound follow-up. Solid

abdominal organs are otherwise unremarkable. There is no

abdominal adenopathy. There is no free fluid or free air within

the abdomen. Increased stool is noted throughout the colon

suggesting constipation. The abdominal portion of the GI tract is

otherwise unremarkable.



Pelvis: No free fluid is noted in the pelvis. There is no pelvic

adenopathy. The appendix is not definitely visualized. No

pericecal inflammatory changes are noted to suggest appendicitis

but cannot exclude appendicitis on this exam based on

nonvisualization. Pelvic portion of the GI tract is otherwise

unremarkable. No bony abnormality is noted.



IMPRESSION:

1. Increased stool throughout the colon consistent with

constipation.

2. Nonvisualization of the appendix therefore not completely

excluding appendicitis although no definite pericecal

inflammatory changes are noted. If there is high clinical concern

for appendicitis consider short-term follow-up repeat exam with

oral and IV contrast .

3. Distended gallbladder, if clinically indicated ultrasound

could better evaluate.

## 2020-12-31 ENCOUNTER — HOSPITAL ENCOUNTER (OUTPATIENT)
Dept: HOSPITAL 62 - RDC | Age: 55
End: 2020-12-31
Attending: NURSE PRACTITIONER
Payer: MEDICARE

## 2020-12-31 VITALS — SYSTOLIC BLOOD PRESSURE: 84 MMHG | DIASTOLIC BLOOD PRESSURE: 38 MMHG

## 2020-12-31 DIAGNOSIS — Z20.828: ICD-10-CM

## 2020-12-31 DIAGNOSIS — R05: ICD-10-CM

## 2020-12-31 DIAGNOSIS — Z91.040: ICD-10-CM

## 2020-12-31 DIAGNOSIS — J06.9: Primary | ICD-10-CM

## 2020-12-31 DIAGNOSIS — R11.0: ICD-10-CM

## 2020-12-31 DIAGNOSIS — M71.552: ICD-10-CM

## 2020-12-31 DIAGNOSIS — J02.9: ICD-10-CM

## 2020-12-31 DIAGNOSIS — Z85.828: ICD-10-CM

## 2020-12-31 DIAGNOSIS — Z88.6: ICD-10-CM

## 2020-12-31 DIAGNOSIS — M79.7: ICD-10-CM

## 2020-12-31 DIAGNOSIS — Z88.8: ICD-10-CM

## 2020-12-31 DIAGNOSIS — R09.89: ICD-10-CM

## 2020-12-31 DIAGNOSIS — M71.551: ICD-10-CM

## 2020-12-31 DIAGNOSIS — R51.9: ICD-10-CM

## 2020-12-31 DIAGNOSIS — Z88.1: ICD-10-CM

## 2020-12-31 DIAGNOSIS — K21.9: ICD-10-CM

## 2020-12-31 DIAGNOSIS — Z86.14: ICD-10-CM

## 2020-12-31 DIAGNOSIS — M71.562: ICD-10-CM

## 2020-12-31 DIAGNOSIS — M71.561: ICD-10-CM

## 2020-12-31 LAB
A TYPE INFLUENZA AG: NEGATIVE
B INFLUENZA AG: NEGATIVE

## 2020-12-31 PROCEDURE — 87070 CULTURE OTHR SPECIMN AEROBIC: CPT

## 2020-12-31 PROCEDURE — 87635 SARS-COV-2 COVID-19 AMP PRB: CPT

## 2020-12-31 PROCEDURE — 87804 INFLUENZA ASSAY W/OPTIC: CPT

## 2020-12-31 PROCEDURE — 87880 STREP A ASSAY W/OPTIC: CPT

## 2020-12-31 PROCEDURE — 99211 OFF/OP EST MAY X REQ PHY/QHP: CPT

## 2020-12-31 PROCEDURE — C9803 HOPD COVID-19 SPEC COLLECT: HCPCS

## 2020-12-31 NOTE — ER RDC ASSESSMENT REPORT
Intake





- In the Last 14 days


Have you traveled outside [place]?: No


Have you been in close contact with someone CONFIRMED: No


Worked in Healthcare?: No





- Symptoms


Subjective Fever(Felt feverish): No


Chills: No


Muscule Aches: No


Runny Nose: Yes


Sore Throat: Yes


Cough (New or worsening chronic cough): Yes


Shortness of breath: No


Nausea or Vomiting: Yes


Headache: Yes


Abdominal Pain: No


Diarrhea(3 or more loose stools in last 24 hours): No





- Do you have any of the following


Chronic lung disease: Asthma or emphysema or COPD: No


Cystic Fibrosis: No


Diabetes: No


High Blood Pressure: No


Cardiovascular Disease: No


Chronic Kidney Disease: No


Chronic Liver Disease: No


Chronic blood disorder like Sickle Cell Disease: No


Weak immune system due to disease or medication: No


Neurologic condition that limits movement: No


Developmental delay - Moderate to Severe: No


Recent (within past 2 weeks) or current Pregnancy: No


Morbid Obesity (>100 pounds over ideal weight): No


Obesity Comment: 





Patient is 5 feet 5 inches weight 112 pounds


Other Comment: 





Patient has a history of lupus





- Objective


Temperature: 98.0 F


Pulse Rate: 65


Respiratory Rate: 18


Blood Pressure: 84/38


O2 Sat by Pulse Oximetry: 65


Objective: 


Given above, testing performed: 
































If Testing Performed:


Test Specimen Type Sent to











General





- General


Information source: Patient


Notes: 





Patient here at [facility] for Covid testing patient denies any known positive exposure

to Covid that she is aware of patient states report having symptoms for over a 

week and a half including a runny nose cough congestion headache and nausea.  

Patient followed up with PCP Mary Ellen goodman and was treated with antibiotics for 

sinus infection.  Patient is here to also be further tested for Covid out of 

abundance of caution.





- Related Data


Allergies/Adverse Reactions: 


                                        





diphenhydramine HCl [From Benadryl] Allergy (Severe, Verified 11/18/19 11:13)


   HEART RACING, RESTLESS LEG


metoclopramide HCl [From Reglan] Allergy (Severe, Verified 11/18/19 11:13)


   HEART RACING, RESTLESS LEG


prochlorperazine maleate [From Compazine] Allergy (Severe, Verified 11/18/19 

11:13)


   HEART RACING, RESTLESS LEG


promethazine HCl [From Phenergan] Allergy (Severe, Verified 11/18/19 11:13)


   HEART RACING RESTLESS LEG


Sulfa (Sulfonamide Antibiotics) Allergy (Severe, Verified 11/18/19 11:13)


   Anaphylaxis


tramadol Allergy (Severe, Verified 11/18/19 11:13)


   N/V/MIGRAINES


doxycycline [Doxycycline] Allergy (Mild, Verified 11/18/19 11:13)


   upset stomach


latex [Latex] Allergy (Mild, Verified 11/18/19 11:13)


   Pruritis


oxycodone HCl [From Percocet] Allergy (Mild, Verified 11/18/19 11:13)


   upset stomach


cefdinir [From Omnicef] Allergy (Verified 11/18/19 11:13)


   











Past Medical History





- General


Information source: Patient





- Social History


Smoking Status: Never Smoker


Family History: Arthritis, CAD, Hyperlipidemia, Hypertension, Malignancy





- Past Medical History


Cardiac Medical History: 


   Denies: Hx Coronary Artery Disease, Hx Heart Attack, Hx Hypertension


Pulmonary Medical History: 


   Denies: Hx Asthma, Hx Bronchitis, Hx COPD - NODULES OF SCAR TISSUE IN LUNGS, 

Hx Pneumonia


Neurological Medical History: Reports: Hx Migraine.  Denies: Hx Cerebrovascular 

Accident, Hx Seizures


Renal/ Medical History: Denies: Hx Peritoneal Dialysis


Malignancy Medical History: Reports: Hx Skin Cancer


GI Medical History: Reports: Hx Gastroesophageal Reflux Disease


Musculoskeletal Medical History: Comment Only Hx Arthritis - FIBROMYALGIA, 

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


Psychiatric Medical History: Reports: Hx Depression


Infectious Medical History: Reports: Hx MRSA


Past Surgical History: Reports: Hx Gynecologic Surgery - ablation, Hx Oral 

Surgery, Hx Orthopedic Surgery - right knee, Hx Tonsillectomy.  Denies: Hx 

Hysterectomy, Hx Pacemaker





Physical Exam





- General


General appearance: Appears well, Alert


In distress: None


Notes: 





PHYSICAL EXAMINATION: 


GENERAL: Well-appearing and in no acute distress. 


HEAD: Atraumatic, normocephalic. 


EYES: sclera anicteric, conjunctiva are normal. 


ENT: nares patent. Moist mucous membranes. 


NECK: Normal range of motion, supple without lymphadenopathy 


LUNGS: CTAB and equal. No wheezes rales or rhonchi.  Respirations even and 

unlabored lung sounds clear.


HEART: Regular rate and rhythm without murmurs 


ABDOMEN: Soft, nontender, normal bowel sounds, no guarding. 


EXTREMITIES: Normal range of motion, no pitting edema. No cyanosis. 


NEUROLOGICAL: Cranial nerves grossly intact. Normal speech. Normal gait.


PSYCH: Normal mood, normal affect. 


SKIN: Warm, Dry, normal turgor, no rashes or lesions noted








Diagnostic Results


Laboratory Results: 


Pending strep culture pending Covid testing results.  Patient provided instr

uctions regarding Covid to include: As a person under investigation for Covid 

19, the [place] department of Health and Human Services, division of 

public health advises you to adhere to the following guidance until your test 

results are reported to you.  If your test result is positive, you will receive 

additional information from your provider and your local health department at 

that time.


 


Remain at home until you are cleared by the health provider or public health 

authorities.


 


Keep a log of visitors to your home, notify any visitors to your home of your 

isolation status.


 


If you plan to move to a new address or leave the county, notify the local 

health department in your County.


 


Call your doctor or seek care if you have an urgent medical need.  Before 

seeking medical care, call ahead to get instructions from the provider before 

arriving at the medical office clinic or hospital.  Notify them that you are 

being tested for the virus that causes Covid 19 so that arrangements can be 

made, as necessary, to prevent transmission to others in the healthcare setting.

 Next, notify the local health department in your county.


 


If a medical emergency arises and you need to call 911, inform the first 

responders that you are being tested for the virus that causes Covid 19.  Next, 

notify the local health department in your [facility].





Patient Education/Counseling


Counseling/Education: 





Patient presents with upper respiratory symptoms worrisome for possible Covid 

19.  Patient does not have emergency worring symptoms such as difficulty br

eathing, shortness of breath, chest pain, pressure, confusion or cyanosis.  

Patient appears suitable for discharge.  Patient instructed to follow-up with 

her PCP Mary Ellen kenyon  To ED for persistent or worsening symptoms.  Patient's 

vital signs are stable and patient is nontoxic in appearance.  Good return 

precautions have been discussed with patient, patient verbalized understanding 

and is agreeable with discharge plan of care at this time.





RDC Discharge





- Discharge


Clinical Impression: 


 Encounter for screening laboratory testing for COVID-19 virus





Upper respiratory infection


Qualifiers:


 URI type: unspecified URI Qualified Code(s): J06.9 - Acute upper respiratory 

infection, unspecified





Condition: Stable


Disposition: Home; Selfcare